# Patient Record
Sex: FEMALE | Race: WHITE | NOT HISPANIC OR LATINO
[De-identification: names, ages, dates, MRNs, and addresses within clinical notes are randomized per-mention and may not be internally consistent; named-entity substitution may affect disease eponyms.]

---

## 2017-01-05 ENCOUNTER — CHARTING TRANS (OUTPATIENT)
Dept: OTHER | Age: 82
End: 2017-01-05

## 2017-03-28 ENCOUNTER — CHARTING TRANS (OUTPATIENT)
Dept: OTHER | Age: 82
End: 2017-03-28

## 2017-03-29 ENCOUNTER — LAB SERVICES (OUTPATIENT)
Dept: OTHER | Age: 82
End: 2017-03-29

## 2017-03-29 LAB
ALBUMIN SERPL BCG-MCNC: 4.5 G/DL (ref 3.6–5.1)
ALP SERPL-CCNC: 60 U/L (ref 45–105)
ALT SERPL W/O P-5'-P-CCNC: 9 U/L (ref 7–34)
AST SERPL-CCNC: 14 U/L (ref 9–37)
BILIRUB DIRECT SERPL-MCNC: 0.1 MG/DL (ref 0–0.2)
BILIRUB SERPL-MCNC: 0.4 MG/DL (ref 0–1)
BUN SERPL-MCNC: 31 MG/DL (ref 7–20)
CALCIUM SERPL-MCNC: 9.6 MG/DL (ref 8.6–10.6)
CHLORIDE SERPL-SCNC: 104 MMOL/L (ref 96–107)
CHOLEST SERPL-MCNC: 153 MG/DL (ref 140–200)
CREATININE, SERUM: 2.2 MG/DL (ref 0.5–1.4)
DIFFERENTIAL TYPE: ABNORMAL
GFR SERPL CREATININE-BSD FRML MDRD: 21 ML/MIN/{1.73M2}
GFR SERPL CREATININE-BSD FRML MDRD: 26 ML/MIN/{1.73M2}
GLUCOSE P FAST SERPL-MCNC: 103 MG/DL (ref 60–100)
HCO3 SERPL-SCNC: 28 MMOL/L (ref 22–32)
HDLC SERPL-MCNC: 88 MG/DL
HEMATOCRIT: 44.2 % (ref 34–45)
HEMOGLOBIN: 14.6 G/DL (ref 11.2–15.7)
LDLC SERPL CALC-MCNC: 48 MG/DL (ref 0–100)
LYMPH PERCENT: 28.9 % (ref 20.5–51.1)
LYMPHOCYTE ABSOLUTE #: 1.6 10*3/UL (ref 1.2–3.4)
MEAN CORPUSCULAR HGB CONCENTRATION: 33 % (ref 32–36)
MEAN CORPUSCULAR HGB: 31.7 PG (ref 27–34)
MEAN CORPUSCULAR VOLUME: 95.9 FL (ref 79–95)
MEAN PLATELET VOLUME: 11.9 FL (ref 8.6–12.4)
MIXED %: 8.1 % (ref 4.3–12.9)
MIXED ABSOLUTE #: 0.5 10*3/UL (ref 0.2–0.9)
NEUTROPHIL ABSOLUTE #: 3.5 10*3/UL (ref 1.4–6.5)
NEUTROPHIL PERCENT: 63 % (ref 34–73.5)
PLATELET COUNT: 157 10*3/UL (ref 150–400)
POTASSIUM SERPL-SCNC: 4.9 MMOL/L (ref 3.5–5.3)
PROT SERPL-MCNC: 6.7 G/DL (ref 6.2–8.1)
RED BLOOD CELL COUNT: 4.61 10*6/UL (ref 3.7–5.2)
RED CELL DISTRIBUTION WIDTH: 14 % (ref 11.3–14.8)
SODIUM SERPL-SCNC: 142 MMOL/L (ref 136–146)
TRIGL SERPL-MCNC: 83 MG/DL (ref 0–200)
TSH SERPL-ACNC: 3.04 M[IU]/L (ref 0.3–4.82)
WHITE BLOOD CELL COUNT: 5.6 10*3/UL (ref 4–10)

## 2017-04-28 ENCOUNTER — CHARTING TRANS (OUTPATIENT)
Dept: OTHER | Age: 82
End: 2017-04-28

## 2017-05-08 ENCOUNTER — CHARTING TRANS (OUTPATIENT)
Dept: INTERNAL MEDICINE | Age: 82
End: 2017-05-08

## 2017-06-15 ENCOUNTER — LAB SERVICES (OUTPATIENT)
Dept: OTHER | Age: 82
End: 2017-06-15

## 2017-06-15 ENCOUNTER — CHARTING TRANS (OUTPATIENT)
Dept: OTHER | Age: 82
End: 2017-06-15

## 2017-06-15 LAB
BILIRUBIN URINE: NEGATIVE
BLOOD URINE: ABNORMAL
CLARITY: ABNORMAL
COLOR: YELLOW
GLUCOSE QUALITATIVE U: NEGATIVE
KETONES, URINE: NEGATIVE
LEUKOCYTE ESTERASE URINE: ABNORMAL
NITRITE URINE: NEGATIVE
PH URINE: 5.5 (ref 5–7)
SPECIFIC GRAVITY URINE: 1.01 (ref 1–1.03)
URINE PROTEIN, QUAL (DIPSTICK): 30
UROBILINOGEN URINE: 0.2

## 2017-06-16 LAB — FINAL REPORT: NORMAL

## 2017-09-25 ENCOUNTER — CHARTING TRANS (OUTPATIENT)
Dept: OTHER | Age: 82
End: 2017-09-25

## 2017-09-30 ENCOUNTER — HOSPITAL ENCOUNTER (EMERGENCY)
Facility: HOSPITAL | Age: 82
Discharge: HOME | End: 2017-09-30
Attending: EMERGENCY MEDICINE
Payer: MEDICARE

## 2017-09-30 VITALS
TEMPERATURE: 98 F | DIASTOLIC BLOOD PRESSURE: 72 MMHG | RESPIRATION RATE: 16 BRPM | HEIGHT: 65 IN | SYSTOLIC BLOOD PRESSURE: 150 MMHG | WEIGHT: 140 LBS | OXYGEN SATURATION: 97 % | BODY MASS INDEX: 23.32 KG/M2 | HEART RATE: 62 BPM

## 2017-09-30 DIAGNOSIS — N30.00 ACUTE CYSTITIS WITHOUT HEMATURIA: ICD-10-CM

## 2017-09-30 DIAGNOSIS — R19.7 DIARRHEA, UNSPECIFIED TYPE: Primary | ICD-10-CM

## 2017-09-30 PROCEDURE — 96361 HYDRATE IV INFUSION ADD-ON: CPT

## 2017-09-30 PROCEDURE — 80053 COMPREHEN METABOLIC PANEL: CPT | Performed by: EMERGENCY MEDICINE

## 2017-09-30 PROCEDURE — 85025 COMPLETE CBC W/AUTO DIFF WBC: CPT | Performed by: EMERGENCY MEDICINE

## 2017-09-30 PROCEDURE — 99284 EMERGENCY DEPT VISIT MOD MDM: CPT

## 2017-09-30 PROCEDURE — 93010 ELECTROCARDIOGRAM REPORT: CPT

## 2017-09-30 PROCEDURE — 83690 ASSAY OF LIPASE: CPT | Performed by: EMERGENCY MEDICINE

## 2017-09-30 PROCEDURE — 87086 URINE CULTURE/COLONY COUNT: CPT | Performed by: EMERGENCY MEDICINE

## 2017-09-30 PROCEDURE — 96365 THER/PROPH/DIAG IV INF INIT: CPT

## 2017-09-30 PROCEDURE — 93005 ELECTROCARDIOGRAM TRACING: CPT

## 2017-09-30 PROCEDURE — 81001 URINALYSIS AUTO W/SCOPE: CPT | Performed by: EMERGENCY MEDICINE

## 2017-09-30 RX ORDER — SULFAMETHOXAZOLE AND TRIMETHOPRIM 800; 160 MG/1; MG/1
1 TABLET ORAL DAILY
Qty: 7 TABLET | Refills: 0 | Status: SHIPPED | OUTPATIENT
Start: 2017-09-30 | End: 2017-10-07

## 2017-09-30 NOTE — ED PROVIDER NOTES
Patient Seen in: [facility] Emergency Department    History   Patient presents with:  Nausea/Vomiting/Diarrhea (gastrointestinal)    Stated Complaint: diarrhea, weakness    HPI    Patient presents with diarrhea.   The patient states that she started distress. HEENT: Normocephalic, atraumatic, pupils equal round and reactive to light, oropharynx clear, uvula midline. Neck: Supple. Cardiovascular: Regular rate and rhythm, no murmurs. Respiratory: Lungs clear to auscultation.   Abdomen: Soft, nontende Report.   Rate: 62  Rhythm: Atrial paced rhythm with prolonged AV conduction  Reading: Incomplete left bundle branch block, subtle nonspecific ST and T-wave abnormality-improved from previous           =======================================================

## 2017-10-02 ENCOUNTER — MYAURORA ACCOUNT LINK (OUTPATIENT)
Dept: OTHER | Age: 82
End: 2017-10-02

## 2017-10-02 ENCOUNTER — CHARTING TRANS (OUTPATIENT)
Dept: OTHER | Age: 82
End: 2017-10-02

## 2017-10-06 ENCOUNTER — CHARTING TRANS (OUTPATIENT)
Dept: OTHER | Age: 82
End: 2017-10-06

## 2017-10-10 ENCOUNTER — CHARTING TRANS (OUTPATIENT)
Dept: INTERNAL MEDICINE | Age: 82
End: 2017-10-10

## 2017-10-12 ENCOUNTER — LAB SERVICES (OUTPATIENT)
Dept: OTHER | Age: 82
End: 2017-10-12

## 2017-10-12 ENCOUNTER — CHARTING TRANS (OUTPATIENT)
Dept: OTHER | Age: 82
End: 2017-10-12

## 2017-10-12 LAB
ALBUMIN SERPL BCG-MCNC: 4 G/DL (ref 3.6–5.1)
ALP SERPL-CCNC: 59 U/L (ref 45–105)
ALT SERPL W/O P-5'-P-CCNC: 9 U/L (ref 7–34)
AST SERPL-CCNC: 13 U/L (ref 9–37)
BILIRUB SERPL-MCNC: 0.4 MG/DL (ref 0–1)
BUN SERPL-MCNC: 35 MG/DL (ref 7–20)
CALCIUM SERPL-MCNC: 9.2 MG/DL (ref 8.6–10.6)
CHLORIDE SERPL-SCNC: 109 MMOL/L (ref 96–107)
CHOLEST SERPL-MCNC: 136 MG/DL (ref 140–200)
CREAT UR-MCNC: 80.2 MG/DL (ref 30–125)
CREATININE, SERUM: 2.2 MG/DL (ref 0.5–1.4)
GFR SERPL CREATININE-BSD FRML MDRD: 21 ML/MIN/{1.73M2}
GFR SERPL CREATININE-BSD FRML MDRD: 25 ML/MIN/{1.73M2}
GLUCOSE P FAST SERPL-MCNC: 111 MG/DL (ref 60–100)
HCO3 SERPL-SCNC: 21 MMOL/L (ref 22–32)
HDLC SERPL-MCNC: 69 MG/DL
LDLC SERPL CALC-MCNC: 49 MG/DL (ref 0–100)
POTASSIUM SERPL-SCNC: 5.2 MMOL/L (ref 3.5–5.3)
PROT SERPL-MCNC: 5.9 G/DL (ref 6.2–8.1)
PROT UR-MCNC: 13 MG/DL (ref 0–12)
PROT/CREAT 24H UR: 0.16 MG/MG (ref 0–0.2)
SODIUM SERPL-SCNC: 140 MMOL/L (ref 136–146)
TRIGL SERPL-MCNC: 87 MG/DL (ref 0–200)
TSH SERPL-ACNC: 2.37 M[IU]/L (ref 0.3–4.82)

## 2017-10-25 ENCOUNTER — APPOINTMENT (OUTPATIENT)
Dept: CV DIAGNOSTICS | Facility: HOSPITAL | Age: 82
DRG: 309 | End: 2017-10-25
Attending: NURSE PRACTITIONER
Payer: MEDICARE

## 2017-10-25 ENCOUNTER — PRIOR ORIGINAL RECORDS (OUTPATIENT)
Dept: OTHER | Age: 82
End: 2017-10-25

## 2017-10-25 ENCOUNTER — APPOINTMENT (OUTPATIENT)
Dept: GENERAL RADIOLOGY | Facility: HOSPITAL | Age: 82
DRG: 309 | End: 2017-10-25
Attending: EMERGENCY MEDICINE
Payer: MEDICARE

## 2017-10-25 ENCOUNTER — HOSPITAL ENCOUNTER (INPATIENT)
Facility: HOSPITAL | Age: 82
LOS: 5 days | Discharge: HOME | DRG: 309 | End: 2017-10-30
Attending: EMERGENCY MEDICINE | Admitting: INTERNAL MEDICINE
Payer: MEDICARE

## 2017-10-25 DIAGNOSIS — I47.2 WIDE-COMPLEX TACHYCARDIA (HCC): Primary | ICD-10-CM

## 2017-10-25 DIAGNOSIS — I48.20 ATRIAL FIBRILLATION, CHRONIC (HCC): ICD-10-CM

## 2017-10-25 PROBLEM — I10 UNSPECIFIED ESSENTIAL HYPERTENSION: Chronic | Status: ACTIVE | Noted: 2017-10-25

## 2017-10-25 PROBLEM — N30.00 ACUTE CYSTITIS WITHOUT HEMATURIA: Status: ACTIVE | Noted: 2017-10-25

## 2017-10-25 PROBLEM — I10 ESSENTIAL HYPERTENSION: Chronic | Status: ACTIVE | Noted: 2017-10-25

## 2017-10-25 PROBLEM — E87.1 HYPONATREMIA: Status: ACTIVE | Noted: 2017-10-25

## 2017-10-25 PROBLEM — R73.9 HYPERGLYCEMIA: Status: ACTIVE | Noted: 2017-10-25

## 2017-10-25 PROBLEM — E03.8 OTHER SPECIFIED HYPOTHYROIDISM: Status: ACTIVE | Noted: 2017-10-25

## 2017-10-25 PROCEDURE — 71010 XR CHEST AP PORTABLE  (CPT=71010): CPT | Performed by: EMERGENCY MEDICINE

## 2017-10-25 PROCEDURE — 4B02XSZ MEASUREMENT OF CARDIAC PACEMAKER, EXTERNAL APPROACH: ICD-10-PCS | Performed by: INTERNAL MEDICINE

## 2017-10-25 PROCEDURE — 99223 1ST HOSP IP/OBS HIGH 75: CPT | Performed by: INTERNAL MEDICINE

## 2017-10-25 PROCEDURE — 93306 TTE W/DOPPLER COMPLETE: CPT | Performed by: NURSE PRACTITIONER

## 2017-10-25 RX ORDER — AMIODARONE HYDROCHLORIDE 50 MG/ML
INJECTION, SOLUTION INTRAVENOUS
Status: COMPLETED
Start: 2017-10-25 | End: 2017-10-25

## 2017-10-25 RX ORDER — METOPROLOL SUCCINATE 50 MG/1
50 TABLET, EXTENDED RELEASE ORAL
Status: DISCONTINUED | OUTPATIENT
Start: 2017-10-26 | End: 2017-10-30

## 2017-10-25 RX ORDER — PRAVASTATIN SODIUM 20 MG
10 TABLET ORAL NIGHTLY
Status: DISCONTINUED | OUTPATIENT
Start: 2017-10-25 | End: 2017-10-25

## 2017-10-25 RX ORDER — HEPARIN SODIUM AND DEXTROSE 10000; 5 [USP'U]/100ML; G/100ML
INJECTION INTRAVENOUS CONTINUOUS
Status: DISPENSED | OUTPATIENT
Start: 2017-10-25 | End: 2017-10-29

## 2017-10-25 RX ORDER — HEPARIN SODIUM AND DEXTROSE 10000; 5 [USP'U]/100ML; G/100ML
12 INJECTION INTRAVENOUS ONCE
Status: COMPLETED | OUTPATIENT
Start: 2017-10-25 | End: 2017-10-25

## 2017-10-25 RX ORDER — LEVOTHYROXINE SODIUM 0.03 MG/1
25 TABLET ORAL
Status: DISCONTINUED | OUTPATIENT
Start: 2017-10-25 | End: 2017-10-30

## 2017-10-25 RX ORDER — HEPARIN SODIUM 5000 [USP'U]/ML
60 INJECTION INTRAVENOUS; SUBCUTANEOUS ONCE
Status: COMPLETED | OUTPATIENT
Start: 2017-10-25 | End: 2017-10-25

## 2017-10-25 RX ORDER — METOPROLOL TARTRATE 5 MG/5ML
5 INJECTION INTRAVENOUS ONCE
Status: DISCONTINUED | OUTPATIENT
Start: 2017-10-25 | End: 2017-10-25

## 2017-10-25 RX ORDER — AMIODARONE HYDROCHLORIDE 200 MG/1
400 TABLET ORAL 2 TIMES DAILY WITH MEALS
Status: DISCONTINUED | OUTPATIENT
Start: 2017-10-25 | End: 2017-10-25

## 2017-10-25 RX ORDER — LATANOPROST 50 UG/ML
1 SOLUTION/ DROPS OPHTHALMIC NIGHTLY
COMMUNITY

## 2017-10-25 RX ORDER — ASPIRIN 81 MG/1
81 TABLET, CHEWABLE ORAL DAILY
Status: DISCONTINUED | OUTPATIENT
Start: 2017-10-25 | End: 2017-10-30

## 2017-10-25 RX ORDER — METOPROLOL TARTRATE 5 MG/5ML
INJECTION INTRAVENOUS
Status: DISPENSED
Start: 2017-10-25 | End: 2017-10-25

## 2017-10-25 RX ORDER — TIMOLOL MALEATE 5 MG/ML
1 SOLUTION/ DROPS OPHTHALMIC EVERY MORNING
COMMUNITY

## 2017-10-25 RX ORDER — PRAVASTATIN SODIUM 10 MG
10 TABLET ORAL NIGHTLY
Status: DISCONTINUED | OUTPATIENT
Start: 2017-10-25 | End: 2017-10-30

## 2017-10-25 NOTE — PLAN OF CARE
Maintains optimal cardiac output and hemodynamic stability Progressing      Absence of cardiac arrhythmias or at baseline Progressing      Patient admitted from ED . Oriented to room.  Admission database completed  AOx4 forgetful;   [address] blt hearing aids  P

## 2017-10-25 NOTE — ED PROVIDER NOTES
Patient Seen in: [facility] Emergency Department    History   No chief complaint on file. Stated Complaint: SOB    HPI    Patient is a 26-year-old female, presenting for evaluation of dyspnea.   Patient states she has had a decreased appetite and Pupils are equal and reactive to light. Oropharynx is pink and moist.  NECK: Neck is supple and nontender. The trachea is midline. LUNGS: Lungs are clear to auscultation bilaterally, respirations are unlabored. HEART: Tachycardic and regular.  There are -----------         ------                     CBC W/ DIFFERENTIAL[[id number]]          Abnormal            Final result                 Please view results for these tests on the individual orders.    URINALYSIS WITH CULTURE REFLEX   BLOOD CULTURE   BLOOD C infusion was ordered. Repeat EKG was obtained and reviewed as noted above. Results of the patient's laboratory and radiology studies were reviewed and discussed with the patient, who remains complaint free on the cart. Amiodarone infusion continues.

## 2017-10-25 NOTE — HISTORICAL OFFICE NOTE
Celestino Chawla  : 10/13/1927  ACCOUNT:  [id number]  [phone]  PCP: Dr. Gladis Pepper     TODAY'S DATE: 2016  DICTATED BY:  Sergio Hess M.D.]    CHIEF COMPLAINT: [Followup of Pacemaker, dependent and Followup of Unspecified atrial flutter.]    HPI: DAILY      04/23/13 *Metoprolol Succinate 25MG      1 TABLET DAILY.                          01/29/10 Travatan Z            0.004%    as direcetd                              01/29/10 Aspirin               81MG      1 tablet daily

## 2017-10-25 NOTE — CONSULTS
[facility]  Report of Consultation    Prudy Plenty Patient Status:  Inpatient    10/13/1927 MRN [id number]   [facility] 0SW-A Attending Ruth Grover MD   Hosp Day # 0 PCP Liberty Syed MD, MADAI     Reason f INSERTION  No date: EYE SURGERY  No date: INNER EAR SURGERY PROC UNLISTED      Comment: as a child  No date: OTHER      Comment: RFA for flutter mastoid as a child  No family history on file. reports that she has never smoked.  She has never used smokeles with LBBB QRS morphology  EKG: afib @ 104  Labs:     Lab Results  Component Value Date   WBC 9.0 10/25/2017   RBC 4.33 10/25/2017   HGB 13.6 10/25/2017   HCT 42.3 10/25/2017   MCV 97.7 10/25/2017   MCH 31.4 10/25/2017   MCHC 32.2 10/25/2017   RDW 13.9 10/2

## 2017-10-25 NOTE — H&P
[facility]                                                               History & Physical         Justice Kae Patient Status:  Inpatient    10/13/1927 MRN [id number]   [place] [facility] 0SW-A Attending Ramon Cornell MD   [facility] Day # 0 Disp:  Rfl:  10/24/2017 at 0900   latanoprost 0.005 % Ophthalmic Solution Place 1 drop into both eyes nightly. Disp:  Rfl:  10/24/2017 at 2100   aspirin 81 MG Oral Chew Tab Chew 81 mg by mouth daily.  Disp:  Rfl:  10/24/2017 at 0900   Lovastatin 20 MG Oral 10/25/2017   ALB 3.2 10/25/2017   ALKPHO 54 10/25/2017   BILT 0.7 10/25/2017   TP 6.7 10/25/2017   AST 11 10/25/2017   ALT 13 10/25/2017   PTT 29.0 10/25/2017   INR 1.05 10/25/2017   PTP 13.7 10/25/2017   TROP <0.046 10/25/2017       Recent Labs   Lab  10/ Prophylaxis: SCDs, IV heparin drip   · CODE status: full  · Agrawal: None    Plan of care discussed with patient, RN      Discussed with ER Physician.       **Certification      PHYSICIAN Certification of Need for Inpatient Hospitalization - Initial Certifica

## 2017-10-25 NOTE — ED INITIAL ASSESSMENT (HPI)
89YF c/c of SOB Pt state she was feeling well and SOB this morning Pt state she was recently treat two weeks ago for UTI

## 2017-10-26 ENCOUNTER — APPOINTMENT (OUTPATIENT)
Dept: CV DIAGNOSTICS | Facility: HOSPITAL | Age: 82
DRG: 309 | End: 2017-10-26
Attending: INTERNAL MEDICINE
Payer: MEDICARE

## 2017-10-26 PROCEDURE — 93018 CV STRESS TEST I&R ONLY: CPT | Performed by: INTERNAL MEDICINE

## 2017-10-26 PROCEDURE — 78452 HT MUSCLE IMAGE SPECT MULT: CPT | Performed by: INTERNAL MEDICINE

## 2017-10-26 PROCEDURE — 93017 CV STRESS TEST TRACING ONLY: CPT | Performed by: INTERNAL MEDICINE

## 2017-10-26 PROCEDURE — 99232 SBSQ HOSP IP/OBS MODERATE 35: CPT | Performed by: INTERNAL MEDICINE

## 2017-10-26 RX ORDER — MAGNESIUM OXIDE 400 MG (241.3 MG MAGNESIUM) TABLET
400 TABLET ONCE
Status: COMPLETED | OUTPATIENT
Start: 2017-10-26 | End: 2017-10-26

## 2017-10-26 RX ORDER — AMIODARONE HYDROCHLORIDE 200 MG/1
400 TABLET ORAL 2 TIMES DAILY WITH MEALS
Status: DISCONTINUED | OUTPATIENT
Start: 2017-10-26 | End: 2017-10-30

## 2017-10-26 RX ORDER — DILTIAZEM HYDROCHLORIDE 5 MG/ML
10 INJECTION INTRAVENOUS EVERY 2 HOUR PRN
Status: DISCONTINUED | OUTPATIENT
Start: 2017-10-26 | End: 2017-10-30

## 2017-10-26 NOTE — PROGRESS NOTES
Preliminary Cardiac Diagnostic Note:    No additional ekg changes from basline changes with lexiscan injection for nuclear stress test  Pt denied cardiac symptoms  Intermittant pacing and few pvcs noted  nuc pending

## 2017-10-26 NOTE — PROGRESS NOTES
Pt received from stress test with amiodarone paused. RN was informed that Amiodarone was paused in transport because the pump started to beep. RN noticed a large lump on L arm from amiodarone infiltration.  Warm compresses applied per pharmacy recommendatio

## 2017-10-26 NOTE — PROGRESS NOTES
[facility]  Cardiology Progress Note    Reggie Rangel Patient Status:  Inpatient    10/13/1927 MRN [id number]   [facility] 2NE-A Attending Mark Pope MD   Hosp Day # 1 PCP DREYER MD, ROMAN     Subjective:  No complaints of chest p bruits. Cardiac: Regular rate and rhythm, S1, S2 normal,   Lungs: Clear Abdomen: Soft, non-tender. Extremities: Without clubbing, cyanosis or edema. Peripheral pulses are 2+. Skin: Warm and dry.      Medications:  • aspirin  81 mg Oral Daily   • Levoth

## 2017-10-26 NOTE — PROGRESS NOTES
[facility]  Progress Note    Shannan Chen Patient Status:  Inpatient    10/13/1927 MRN [id number]   [facility] 2NE-A Attending Bhupendra Richardson MD   Hosp Day # 1 PCP Anastasia Neville MD, MADAI     CC: Palpitations    SUBJECTIVE:  Palpitations li 13.2 10/26/2017   HCT 40.4 10/26/2017   .0 10/26/2017   CREATSERUM 1.87 10/26/2017   BUN 25 10/26/2017    10/26/2017   K 4.5 10/26/2017    10/26/2017   CO2 23.0 10/26/2017    10/26/2017   CA 8.3 10/26/2017   PTT 56.3 10/26/2017 post 1 dose of IV Lopressor on admission. 4.  Continued on home dose of metoprolol  5. Stress test today per cardiology  6. Potassium and magnesium level normal  2.  Atrial fibrillation with previous history of pacemaker–cardiology following  3.  hypothyr

## 2017-10-26 NOTE — PAYOR COMM NOTE
Ivetpatricia Connell  (MR # [id number])   Order Admit to inpatient Once Nadia Mcghee (Order [id number])   Order Requisition   Admit to inpatient Once (Order #[id number]) on 10/25/17        Question Answer   Diagnosis Wide-complex tachycardia [facility])       ADMISSION REVIEW PEPTIDE - Abnormal; Notable for the following:     Pro-Beta Natriuretic Peptide 5,944 (*)    CBC W/ DIFFERENTIAL - Abnormal; Notable for the following:     RDW-SD 50.6 (*)     Neutrophil Absolute Prelim 6.95 (*)     Neutrophil Absolute 6.95 (*)     Lymphoc numbness. Home Medications:    Prescriptions Prior to Admission:  Timolol Maleate 0.5 % Ophthalmic Solution Place 1 drop into both eyes every morning. latanoprost 0.005 % Ophthalmic Solution Place 1 drop into both eyes nightly.    aspirin 81 MG Oral Ch sensitivity  5. Hyperlipidemia–continue statin  6. Essential hypertension–continue metoprolol, follow blood pressure  7. Glaucoma–continue home eyedrops  Mild hyponatremia    Ana Narvaez MD  10/25/2017    CARDS CONSULT:   Wide complex tachycardia - ? VT v Units/hr Intravenous Gianaisaias Cisneros RN      heparin (PORCINE) 26159ocfsr/250mL infusion INITIAL DOSE     Date Action Dose Route User    10/25/2017 1410 New Bag 12 Units/kg/hr × 63.5 kg Intravenous Lio Pulido RN      Levothyroxine Sodium ([place] Lower frequent couplets, triplets, short runs of NSVT  Up to 15 beast last night    Medications:  • aspirin  81 mg Oral Daily   • Levothyroxine Sodium  25 mcg Oral Before breakfast   • metoprolol succinate  50 mg Oral Daily Beta Blocker   • Pravastatin Sodium  1

## 2017-10-26 NOTE — PLAN OF CARE
Stress test negative for ischemia, EF 43%. Discussed with Dr. Ron Ocampo, transition to PO amiodarone 400 mg BID.

## 2017-10-26 NOTE — PROGRESS NOTES
Received pt from [facility] at [address]. Pt a/ox4. Forgetful. [place] with bilateral HA. RA. V-paced with underlying a-fib. Bursts of wide complex tachycardia at times. EF = 60-65%. BM today. IV anbx given in ER for UTI. Urine culture pending.  Chronic rash to back and bu

## 2017-10-27 PROCEDURE — 99232 SBSQ HOSP IP/OBS MODERATE 35: CPT | Performed by: INTERNAL MEDICINE

## 2017-10-27 PROCEDURE — 5A2204Z RESTORATION OF CARDIAC RHYTHM, SINGLE: ICD-10-PCS | Performed by: INTERNAL MEDICINE

## 2017-10-27 RX ORDER — SODIUM CHLORIDE 9 MG/ML
INJECTION, SOLUTION INTRAVENOUS CONTINUOUS
Status: DISCONTINUED | OUTPATIENT
Start: 2017-10-27 | End: 2017-10-29

## 2017-10-27 NOTE — PROGRESS NOTES
FOR BEDSDE CARDIOVERSION. CONSENT SIGNED. ON BEDSIDE. ROUTINE PRE-OP CARE RENDERED. FAMILY MEMBER ON BEDSIDE. O2 2L/NC. SUCTION SET-UP INITIATED. Kristian Knock 9 NS AT 50 CC/HR. GIVEN BREVITAL 40 MG IVP PER DR. ABBOTT. CARDIOVERTED WITH 200 JOULES. BACK TO ATRIAL PACED. BP-10

## 2017-10-27 NOTE — PROCEDURES
PROCEDURE(S) PERFORMED:    1. Cardioversion. 2.     Sedation     :  Anette Hernandez MD     ANESTHESIA:  IV sedation. INDICATION:  Persistent atrial fibrillation. COMPLICATIONS:  None.      METHODS:  The patient was brought to the outpatient

## 2017-10-27 NOTE — PROGRESS NOTES
[facility]  Progress Note    Alyssa Gurrola Patient Status:  Inpatient    10/13/1927 MRN [id number]   [facility] 2NE-A Attending Ana Rabago MD   Hosp Day # 2 PCP Hari Vásquez MD, MADAI     CC: Palpitations    SUBJECTIVE:  Palpitations li CHEST AP PORTABLE  (CPT=71010), 1/25/2014, 12:17.      INDICATIONS:  SOB     PATIENT STATED HISTORY: (As transcribed by Technologist)  Patient states that she was short of breath this morning and that she had a recent UTI.         =====  CONCLUSION:      No sensitivity with no growth  5. Hyperlipidemia–continue statin  6. Essential hypertension–continue metoprolol, follow blood pressure  7. Glaucoma–continue home eyedrops  8.  Mild hyponatremia        Quality:  · DVT Prophylaxis: SCDs, IV heparin drip   · CODE

## 2017-10-27 NOTE — PROGRESS NOTES
[facility]  Cardiology Progress Note    Anca Rios Patient Status:  Inpatient    10/13/1927 MRN [id number]   [facility] 2NE-A Attending Kavita Ventura MD   Hosp Day # 2 PCP DREYER MD, MADAI     Subjective:  Feels great, \"so much b flutter with prior ablation 2010  · SSS s/p St. Jaden PPM 2010  · CKD- stable     Plan:     · Continue heparin. Will need to discuss long term anticoagulation. · Possible DCCV, to review with Dr. Porsche Clark  · Continue Amiodarone, BB.  PRN Cardizem for tachycar

## 2017-10-27 NOTE — CM/SW NOTE
10/27/17 1430   CM/SW Screening   Referral [phone] [facility] staff; Chart review;Nursing rounds   Patient's Mental Status Alert;Oriented   Patient's Home Environment Senior Independent Housing  [facility])   Patient

## 2017-10-27 NOTE — PLAN OF CARE
A/Ox4, vss on room air. afib on tele, up to 120s with activity. BB given this evening rather than am due to stress test, will monitor for need for PRN Cardizem. Heparin drip infusing per acs/afib protocol.  DAVIS swelling from infiltrated IV this afternoon, w

## 2017-10-28 ENCOUNTER — PRIOR ORIGINAL RECORDS (OUTPATIENT)
Dept: OTHER | Age: 82
End: 2017-10-28

## 2017-10-28 PROBLEM — I49.5 SICK SINUS SYNDROME (HCC): Chronic | Status: ACTIVE | Noted: 2017-10-28

## 2017-10-28 PROBLEM — Z86.79 ATRIAL FIBRILLATION, CURRENTLY IN SINUS RHYTHM: Status: ACTIVE | Noted: 2017-10-25

## 2017-10-28 PROBLEM — Z95.0 HISTORY OF CARDIAC PACEMAKER: Chronic | Status: ACTIVE | Noted: 2017-10-28

## 2017-10-28 PROCEDURE — 99232 SBSQ HOSP IP/OBS MODERATE 35: CPT | Performed by: INTERNAL MEDICINE

## 2017-10-28 RX ORDER — DIPHENHYDRAMINE HCL 25 MG
25 CAPSULE ORAL EVERY 6 HOURS PRN
Status: DISCONTINUED | OUTPATIENT
Start: 2017-10-28 | End: 2017-10-30

## 2017-10-28 NOTE — PLAN OF CARE
Alert. Oriented. [place]- vishal h.aids. Denies cp, sob. A.paced/sr per tele. hepain gtt infusing. Up w/ sb assist. Voiding adeq. poc updated and discussed with patient. Cont. to [facility] pt.

## 2017-10-28 NOTE — PROGRESS NOTES
[facility] Cardiology  Progress Note    Marcy Larose Patient Status:  Inpatient    10/13/1927 MRN [id number]   [facility] 2NE-A Attending Crissy Mcginnis MD   Hosp Day # 3 PCP DREYER MD, ROMAN     Subjective:  Resting comfortably.   No issues Check QT on EKG tomorrow. Anticipate home Monday if no further rhythm issues. D/W nursing.     Blanca Garcia  10/28/2017  12:00 PM

## 2017-10-28 NOTE — PLAN OF CARE
Dr Fariha Medina in with pt for successful cardioversion at [address] [place] [address]. Pt VSS post stable (see recovery VS per flowsheets.) . Pt HR is Apaced. Pt states she is feeling better.

## 2017-10-28 NOTE — CM/SW NOTE
Order received to check Eliquis coverage. Per APN the pt will not dc until Monday. CM requested a SW order with full prescribing information if it is decided she will need Eliquis at dc.      Penelope Saenz RN, [facility]    [phone] pgr: [phone]

## 2017-10-28 NOTE — PLAN OF CARE
Received patient at [phone] from cath lab. Alert and Oriented x4. Tele Rhythm NSR with BBB. O2 saturation 96% On room air. Breath sounds diminished. Bed is locked and in low position. Call light and personal items within reach. No C/O chest pain or SOB.  Pt or

## 2017-10-28 NOTE — PROGRESS NOTES
[facility]  Progress Note    Brian Harm Patient Status:  Inpatient    10/13/1927 MRN [id number]   [facility] 2NE-A Attending Valerie Garcia MD   Hosp Day # 3 PCP Rupa Armstrong MD, MADAI     CC: Palpitations    SUBJECTIVE:  Palpitations li Imaging:  XR CHEST AP PORTABLE (CPT=71010)     TECHNIQUE:  AP chest radiograph was obtained. COMPARISON:  [facility] XR CHEST AP PORTABLE  (CPT=71010), 1/25/2014, 12:17.      INDICATIONS:  SOB     PATIENT STATED HISTORY: (As transcribed by [place] cardioversion by Dr. Jovon Sue 10/27/2017  9. Monitor over the weekend for further episodes of wide-complex tachycardia episodes and discharge on Monday per cardiology Dr. Jovon Sue  2.  Atrial fibrillation with RVR ; previous history of pacemaker–cardiology following;

## 2017-10-28 NOTE — PHYSICAL THERAPY NOTE
PHYSICAL THERAPY EVALUATION - INPATIENT     Room Number: 9951/6281-G  Evaluation Date: 10/28/2017  Type of Evaluation: Initial  Physician Order: PT Eval and Treat    Presenting Problem: s/p ED visit on 10/25/17 with dysnpea and tachycardia  Reason for needs one. SUBJECTIVE  \"I didn't recognize where I was\"    Patient self-stated goal is to go home.     OBJECTIVE  Precautions: Hard of hearing  Fall Risk: Standard fall risk    WEIGHT BEARING RESTRICTION  Weight Bearing Restriction: None Score:  Raw Score: 22   PT Approx Degree of Impairment Score: 20.91%   Standardized Score (AM-PAC Scale): 53.28   CMS Modifier (G-Code): CJ    FUNCTIONAL ABILITY STATUS  Gait Assessment   Gait Assistance: Supervision  Distance (ft): 200  Assistive Device: this evaluation, patient's clinical presentation is stable and overall the evaluation complexity is considered moderate. These impairments and comorbidities manifest themselves as functional limitations in independent bed mobility, transfers, and gait.   Apurva Han

## 2017-10-29 PROCEDURE — 99232 SBSQ HOSP IP/OBS MODERATE 35: CPT | Performed by: INTERNAL MEDICINE

## 2017-10-29 NOTE — PROGRESS NOTES
· [facility] Card[facility] Progress Note     Subjective:  Up in chair - no pain or dyspnea    Objective:  A pace, v sense  147/48  Afebrile  I/O - 270    PTT 63.1    Cardiac:S1 S2 regula  Lungs:clear   Abdomen:soft  Extremities:no edema  Skin:warm/dry    As

## 2017-10-29 NOTE — PROGRESS NOTES
[facility]  Progress Note    Dee Gave Patient Status:  Inpatient    10/13/1927 MRN [id number]   [facility] 2NE-A Attending Mehran Newsome MD   Hosp Day # 4 PCP Oral Rhoda HERNANDEZ, MADAI     CC: Palpitations    SUBJECTIVE:  Palpitations li radiograph was obtained. COMPARISON:  [facility] , XR CHEST AP PORTABLE  (CPT=71010), 1/25/2014, 12:17.      INDICATIONS:  SOB     PATIENT STATED HISTORY: (As transcribed by Technologist)  Patient states that she was short of breath this morning and that sh previous history of pacemaker–cardiology following; on metoprolol and heparin drip; IV cardizem Prn; possible cardioversion per cardiology  3.  hypothyroidism–continue levothyroxine, follow TSH with reflex free T4  4.  Dysuria with positive UA , no UTI– dis

## 2017-10-30 ENCOUNTER — PRIOR ORIGINAL RECORDS (OUTPATIENT)
Dept: OTHER | Age: 82
End: 2017-10-30

## 2017-10-30 VITALS
BODY MASS INDEX: 24 KG/M2 | TEMPERATURE: 97 F | WEIGHT: 143.31 LBS | OXYGEN SATURATION: 97 % | RESPIRATION RATE: 18 BRPM | DIASTOLIC BLOOD PRESSURE: 52 MMHG | SYSTOLIC BLOOD PRESSURE: 134 MMHG | HEART RATE: 62 BPM

## 2017-10-30 PROCEDURE — 99239 HOSP IP/OBS DSCHRG MGMT >30: CPT | Performed by: INTERNAL MEDICINE

## 2017-10-30 RX ORDER — AMIODARONE HYDROCHLORIDE 200 MG/1
200 TABLET ORAL DAILY
Qty: 30 TABLET | Refills: 1 | Status: SHIPPED | OUTPATIENT
Start: 2017-10-30 | End: 2018-01-12

## 2017-10-30 NOTE — CM/SW NOTE
Order received to check for Eliquis coverage. Prescription faxed to patient's [address] [phone].     Gretta Penn RN,   Phone [phone]  Pager [phone] prior auth required, call to Reggie Leahy, appr

## 2017-10-30 NOTE — PROGRESS NOTES
[facility]  Progress Note    Cloretta Eder Patient Status:  Inpatient    10/13/1927 MRN [id number]   [facility] 2NE-A Attending Chely Schuster MD   Hosp Day # 11 PCP Michel Plaza MD, MADAI     CC: Palpitations    SUBJECTIVE:  Palpitations li obtained. COMPARISON:  [facility] , XR CHEST AP PORTABLE  (CPT=71010), 1/25/2014, 12:17.      INDICATIONS:  SOB     PATIENT STATED HISTORY: (As transcribed by Technologist)  Patient states that she was short of breath this morning and that she had a recent pacemaker–cardiology following; on metoprolol and heparin drip; IV cardizem Prn; possible cardioversion per cardiology  3.  hypothyroidism–continue levothyroxine, follow TSH with reflex free T4  4.  Dysuria with positive UA , no UTI– discontinued IV Rocephi

## 2017-10-30 NOTE — PROGRESS NOTES
[facility]  Cardiology Progress Note    Efrain Liu Patient Status:  Inpatient    10/13/1927 MRN [id number]   [facility] 2NE-A Attending Lio Portillo MD   Hosp Day # 5 PCP Destiny Brooks MD, MADAI     Subjective:  Doing well with no compl ms, in sinus   · Hx Atrial flutter with prior ablation 2010  · SSS s/p St. Jaden PPM 2010  · CKD- stable     Plan:     · Social work to look into coverage for [employer]. Will likely be able to stop ASA. · Decrease Amiodarone to 200 mg daily at discharge.   ·

## 2017-10-30 NOTE — DISCHARGE SUMMARY
[facility]  Discharge Summary    Damien Gaines Patient Status:  Inpatient    10/13/1927 MRN [id number]   [facility] 2NE-A Attending No att. providers found   Hosp Day # 5 PCP MADAI Valdivia MD     Date of Admission: 10/25/2017    Date cardiology  Was initially treated with IV amiodarone drip which was changed to oral by cardiology.    Status post 1 dose of IV Lopressor on admission.    Continued on home dose of metoprolol  Patient had a stress test done which was normal  Potassium and m Discharge Medications      START taking these medications      Instructions Prescription details   amiodarone HCl 200 MG Tabs  Commonly known as:  PACERONE      Take 1 tablet (200 mg total) by mouth daily.    Quantity:  30 tablet  Refills:  1     api (Oral)   Resp 18   Wt 143 lb 4.8 oz (65 kg)   SpO2 97%   BMI 23.85 kg/m²       General appearance: alert and cooperative  Head: Normocephalic, without obvious abnormality, atraumatic  Throat: oral mucosa moist  Neck: no adenopathy, no carotid bruit, no JVD

## 2017-10-31 ENCOUNTER — PRIOR ORIGINAL RECORDS (OUTPATIENT)
Dept: OTHER | Age: 82
End: 2017-10-31

## 2017-11-10 ENCOUNTER — LAB SERVICES (OUTPATIENT)
Dept: OTHER | Age: 82
End: 2017-11-10

## 2017-11-10 ENCOUNTER — CHARTING TRANS (OUTPATIENT)
Dept: OTHER | Age: 82
End: 2017-11-10

## 2017-11-10 LAB
BILIRUBIN URINE: NEGATIVE
BLOOD URINE: ABNORMAL
CLARITY: ABNORMAL
COLOR: YELLOW
GLUCOSE QUALITATIVE U: NEGATIVE
KETONES, URINE: ABNORMAL
LEUKOCYTE ESTERASE URINE: ABNORMAL
NITRITE URINE: POSITIVE
PH URINE: 5.5 (ref 5–7)
SPECIFIC GRAVITY URINE: 1.02 (ref 1–1.03)
URINE PROTEIN, QUAL (DIPSTICK): 30
UROBILINOGEN URINE: 0.2

## 2017-11-13 LAB — FINAL REPORT: ABNORMAL

## 2017-11-15 ENCOUNTER — PRIOR ORIGINAL RECORDS (OUTPATIENT)
Dept: OTHER | Age: 82
End: 2017-11-15

## 2017-11-15 ENCOUNTER — MYAURORA ACCOUNT LINK (OUTPATIENT)
Dept: OTHER | Age: 82
End: 2017-11-15

## 2017-12-04 LAB
BUN: 33 MG/DL
CALCIUM: 9.2 MG/DL
CHLORIDE: 106 MEQ/L
CREATININE, SERUM: 1.9 MG/DL
GLUCOSE: 89 MG/DL
HEMATOCRIT: 38.4 %
HEMOGLOBIN: 12.4 G/DL
MAGNESIUM: 2.2 MG/DL
PLATELETS: 166 K/UL
POTASSIUM, SERUM: 4.6 MEQ/L
PROBNP: 5944 PG/ML
RED BLOOD COUNT: 3.96 X 10-6/U
SODIUM: 139 MEQ/L
WHITE BLOOD COUNT: 5.9 X 10-3/U

## 2018-01-12 ENCOUNTER — HOSPITAL ENCOUNTER (OUTPATIENT)
Facility: HOSPITAL | Age: 83
Setting detail: OBSERVATION
Discharge: NURSING, REHAB OR LONG-TERM CARE | End: 2018-01-15
Attending: EMERGENCY MEDICINE | Admitting: HOSPITALIST
Payer: MEDICARE

## 2018-01-12 ENCOUNTER — APPOINTMENT (OUTPATIENT)
Dept: CT IMAGING | Facility: HOSPITAL | Age: 83
End: 2018-01-12
Attending: EMERGENCY MEDICINE
Payer: MEDICARE

## 2018-01-12 DIAGNOSIS — R47.81 SLURRED SPEECH: Primary | ICD-10-CM

## 2018-01-12 LAB
ALBUMIN SERPL-MCNC: 3 G/DL (ref 3.5–4.8)
ALP LIVER SERPL-CCNC: 67 U/L (ref 55–142)
ALT SERPL-CCNC: 13 U/L (ref 14–54)
APTT PPP: 30.9 SECONDS (ref 25–34)
AST SERPL-CCNC: 14 U/L (ref 15–41)
ATRIAL RATE: 60 BPM
BASOPHILS # BLD AUTO: 0.04 X10(3) UL (ref 0–0.1)
BASOPHILS NFR BLD AUTO: 0.6 %
BILIRUB SERPL-MCNC: 0.3 MG/DL (ref 0.1–2)
BILIRUB UR QL STRIP.AUTO: NEGATIVE
BUN BLD-MCNC: 42 MG/DL (ref 8–20)
CALCIUM BLD-MCNC: 8.9 MG/DL (ref 8.3–10.3)
CHLORIDE: 106 MMOL/L (ref 101–111)
CO2: 19 MMOL/L (ref 22–32)
COLOR UR AUTO: YELLOW
CREAT BLD-MCNC: 2.91 MG/DL (ref 0.55–1.02)
EOSINOPHIL # BLD AUTO: 0.25 X10(3) UL (ref 0–0.3)
EOSINOPHIL NFR BLD AUTO: 3.7 %
ERYTHROCYTE [DISTWIDTH] IN BLOOD BY AUTOMATED COUNT: 13.5 % (ref 11.5–16)
GLUCOSE BLD-MCNC: 101 MG/DL (ref 70–99)
GLUCOSE UR STRIP.AUTO-MCNC: NEGATIVE MG/DL
HCT VFR BLD AUTO: 39 % (ref 34–50)
HGB BLD-MCNC: 13 G/DL (ref 12–16)
IMMATURE GRANULOCYTE COUNT: 0.02 X10(3) UL (ref 0–1)
IMMATURE GRANULOCYTE RATIO %: 0.3 %
INR BLD: 1.11 (ref 0.89–1.11)
KETONES UR STRIP.AUTO-MCNC: NEGATIVE MG/DL
LYMPHOCYTES # BLD AUTO: 0.51 X10(3) UL (ref 0.9–4)
LYMPHOCYTES NFR BLD AUTO: 7.5 %
M PROTEIN MFR SERPL ELPH: 6.8 G/DL (ref 6.1–8.3)
MCH RBC QN AUTO: 31.6 PG (ref 27–33.2)
MCHC RBC AUTO-ENTMCNC: 33.3 G/DL (ref 31–37)
MCV RBC AUTO: 94.7 FL (ref 81–100)
MONOCYTES # BLD AUTO: 0.72 X10(3) UL (ref 0.1–0.6)
MONOCYTES NFR BLD AUTO: 10.6 %
NEUTROPHIL ABS PRELIM: 5.27 X10 (3) UL (ref 1.3–6.7)
NEUTROPHILS # BLD AUTO: 5.27 X10(3) UL (ref 1.3–6.7)
NEUTROPHILS NFR BLD AUTO: 77.3 %
NITRITE UR QL STRIP.AUTO: NEGATIVE
P AXIS: 78 DEGREES
P-R INTERVAL: 276 MS
PH UR STRIP.AUTO: 6 [PH] (ref 4.5–8)
PLATELET # BLD AUTO: 233 10(3)UL (ref 150–450)
POTASSIUM SERPL-SCNC: 5 MMOL/L (ref 3.6–5.1)
PROT UR STRIP.AUTO-MCNC: 30 MG/DL
PSA SERPL DL<=0.01 NG/ML-MCNC: 14.3 SECONDS (ref 12–14.3)
Q-T INTERVAL: 462 MS
QRS DURATION: 126 MS
QTC CALCULATION (BEZET): 462 MS
R AXIS: 15 DEGREES
RBC # BLD AUTO: 4.12 X10(6)UL (ref 3.8–5.1)
RBC #/AREA URNS AUTO: >10 /HPF
RED CELL DISTRIBUTION WIDTH-SD: 47.6 FL (ref 35.1–46.3)
SODIUM SERPL-SCNC: 134 MMOL/L (ref 136–144)
SP GR UR STRIP.AUTO: 1.01 (ref 1–1.03)
T AXIS: 49 DEGREES
UROBILINOGEN UR STRIP.AUTO-MCNC: <2 MG/DL
VENTRICULAR RATE: 60 BPM
WBC # BLD AUTO: 6.8 X10(3) UL (ref 4–13)
WBC #/AREA URNS AUTO: >50 /HPF
WBC CLUMPS UR QL AUTO: PRESENT

## 2018-01-12 PROCEDURE — 99220 INITIAL OBSERVATION CARE,LEVL III: CPT | Performed by: HOSPITALIST

## 2018-01-12 PROCEDURE — 70450 CT HEAD/BRAIN W/O DYE: CPT | Performed by: EMERGENCY MEDICINE

## 2018-01-12 RX ORDER — ACETAMINOPHEN 325 MG/1
650 TABLET ORAL EVERY 4 HOURS PRN
Status: DISCONTINUED | OUTPATIENT
Start: 2018-01-12 | End: 2018-01-15

## 2018-01-12 RX ORDER — LABETALOL HYDROCHLORIDE 5 MG/ML
10 INJECTION, SOLUTION INTRAVENOUS EVERY 10 MIN PRN
Status: DISCONTINUED | OUTPATIENT
Start: 2018-01-12 | End: 2018-01-15

## 2018-01-12 RX ORDER — PHENYLEPHRINE HCL IN 0.9% NACL 50MG/250ML
PLASTIC BAG, INJECTION (ML) INTRAVENOUS CONTINUOUS PRN
Status: DISCONTINUED | OUTPATIENT
Start: 2018-01-12 | End: 2018-01-13

## 2018-01-12 RX ORDER — FAMOTIDINE 10 MG/ML
20 INJECTION, SOLUTION INTRAVENOUS DAILY
Status: DISCONTINUED | OUTPATIENT
Start: 2018-01-12 | End: 2018-01-12

## 2018-01-12 RX ORDER — CALCIUM CARBONATE/VITAMIN D3 600 MG-10
1 TABLET ORAL DAILY
COMMUNITY
End: 2020-09-22

## 2018-01-12 RX ORDER — PANTOPRAZOLE SODIUM 20 MG/1
20 TABLET, DELAYED RELEASE ORAL
COMMUNITY
End: 2020-09-22

## 2018-01-12 RX ORDER — OMEGA-3/DHA/EPA/FISH OIL 60 MG-90MG
CAPSULE ORAL DAILY
Status: DISCONTINUED | OUTPATIENT
Start: 2018-01-12 | End: 2018-01-12

## 2018-01-12 RX ORDER — MORPHINE SULFATE 2 MG/ML
2 INJECTION, SOLUTION INTRAMUSCULAR; INTRAVENOUS EVERY 2 HOUR PRN
Status: DISCONTINUED | OUTPATIENT
Start: 2018-01-12 | End: 2018-01-13

## 2018-01-12 RX ORDER — PRAVASTATIN SODIUM 10 MG
10 TABLET ORAL NIGHTLY
Status: DISCONTINUED | OUTPATIENT
Start: 2018-01-12 | End: 2018-01-12

## 2018-01-12 RX ORDER — ONDANSETRON 2 MG/ML
4 INJECTION INTRAMUSCULAR; INTRAVENOUS EVERY 6 HOURS PRN
Status: DISCONTINUED | OUTPATIENT
Start: 2018-01-12 | End: 2018-01-15

## 2018-01-12 RX ORDER — FAMOTIDINE 20 MG/1
20 TABLET ORAL DAILY
Status: DISCONTINUED | OUTPATIENT
Start: 2018-01-12 | End: 2018-01-12

## 2018-01-12 RX ORDER — SODIUM CHLORIDE 9 MG/ML
INJECTION, SOLUTION INTRAVENOUS CONTINUOUS
Status: ACTIVE | OUTPATIENT
Start: 2018-01-12 | End: 2018-01-15

## 2018-01-12 RX ORDER — ACETAMINOPHEN 650 MG/1
650 SUPPOSITORY RECTAL EVERY 4 HOURS PRN
Status: DISCONTINUED | OUTPATIENT
Start: 2018-01-12 | End: 2018-01-15

## 2018-01-12 RX ORDER — TIMOLOL MALEATE 5 MG/ML
1 SOLUTION/ DROPS OPHTHALMIC EVERY MORNING
Status: DISCONTINUED | OUTPATIENT
Start: 2018-01-13 | End: 2018-01-15

## 2018-01-12 RX ORDER — ASPIRIN 81 MG/1
324 TABLET, CHEWABLE ORAL ONCE
Status: COMPLETED | OUTPATIENT
Start: 2018-01-12 | End: 2018-01-12

## 2018-01-12 RX ORDER — SENNOSIDES 8.6 MG
17.2 TABLET ORAL NIGHTLY
Status: DISCONTINUED | OUTPATIENT
Start: 2018-01-12 | End: 2018-01-15

## 2018-01-12 RX ORDER — PANTOPRAZOLE SODIUM 20 MG/1
20 TABLET, DELAYED RELEASE ORAL
Status: DISCONTINUED | OUTPATIENT
Start: 2018-01-13 | End: 2018-01-15

## 2018-01-12 RX ORDER — ASPIRIN 325 MG
325 TABLET ORAL DAILY
Status: DISCONTINUED | OUTPATIENT
Start: 2018-01-12 | End: 2018-01-13

## 2018-01-12 RX ORDER — METOCLOPRAMIDE HYDROCHLORIDE 5 MG/ML
10 INJECTION INTRAMUSCULAR; INTRAVENOUS EVERY 8 HOURS PRN
Status: DISCONTINUED | OUTPATIENT
Start: 2018-01-12 | End: 2018-01-15

## 2018-01-12 RX ORDER — MORPHINE SULFATE 2 MG/ML
1 INJECTION, SOLUTION INTRAMUSCULAR; INTRAVENOUS EVERY 2 HOUR PRN
Status: DISCONTINUED | OUTPATIENT
Start: 2018-01-12 | End: 2018-01-13

## 2018-01-12 RX ORDER — ATORVASTATIN CALCIUM 80 MG/1
80 TABLET, FILM COATED ORAL NIGHTLY
Status: DISCONTINUED | OUTPATIENT
Start: 2018-01-12 | End: 2018-01-15

## 2018-01-12 RX ORDER — METOPROLOL SUCCINATE 50 MG/1
50 TABLET, EXTENDED RELEASE ORAL
Status: DISCONTINUED | OUTPATIENT
Start: 2018-01-13 | End: 2018-01-15

## 2018-01-12 RX ORDER — LEVOTHYROXINE SODIUM 0.03 MG/1
25 TABLET ORAL
Status: DISCONTINUED | OUTPATIENT
Start: 2018-01-12 | End: 2018-01-15

## 2018-01-12 RX ORDER — ASPIRIN 300 MG
300 SUPPOSITORY, RECTAL RECTAL DAILY
Status: DISCONTINUED | OUTPATIENT
Start: 2018-01-12 | End: 2018-01-13

## 2018-01-12 RX ORDER — LATANOPROST 50 UG/ML
1 SOLUTION/ DROPS OPHTHALMIC NIGHTLY
Status: DISCONTINUED | OUTPATIENT
Start: 2018-01-12 | End: 2018-01-15

## 2018-01-12 RX ORDER — PROPAFENONE HYDROCHLORIDE 150 MG/1
150 TABLET, FILM COATED ORAL EVERY 8 HOURS
COMMUNITY
End: 2018-01-15

## 2018-01-12 NOTE — ED PROVIDER NOTES
Patient Seen in: [facility] Emergency Department    History   Patient presents with:  Stroke (neurologic)    Stated Complaint: Stroke    HPI    27-year-old female presents emergency department who states started having some slurred speech around 3 P slurred speech  HEENT: Pupils equal react to light extraocular muscles intact no scleral icterus, mucous membranes are moist, there is no erythema or exudate in the posterior pharynx  Neck: Supple no JVD no lymphadenopathy no meningismus no carotid bruit RAINBOW DRAW LAVENDER   RAINBOW DRAW LIGHT GREEN   RAINBOW DRAW GOLD     EKG    Rate, intervals and axes as noted on EKG Report.   Rate: 60  Rhythm: Sinus Rhythm  Reading: Nonspecific intraventricular block         Ct Brain Or Head (39235)    Result Date:

## 2018-01-12 NOTE — ED INITIAL ASSESSMENT (HPI)
PT c/o slurred speech around 1500, PT stays at assisted living facility who called 911 due to PT's speech deficit; EMS rpt PT has no other symptoms and [place] Stroke scale score is 0.  NIH -1

## 2018-01-13 ENCOUNTER — APPOINTMENT (OUTPATIENT)
Dept: CV DIAGNOSTICS | Facility: HOSPITAL | Age: 83
End: 2018-01-13
Attending: Other
Payer: MEDICARE

## 2018-01-13 ENCOUNTER — APPOINTMENT (OUTPATIENT)
Dept: ULTRASOUND IMAGING | Facility: HOSPITAL | Age: 83
End: 2018-01-13
Attending: Other
Payer: MEDICARE

## 2018-01-13 PROBLEM — I10 BENIGN ESSENTIAL HTN: Chronic | Status: ACTIVE | Noted: 2017-10-25

## 2018-01-13 LAB
CHOLEST SMN-MCNC: 111 MG/DL (ref ?–200)
EST. AVERAGE GLUCOSE BLD GHB EST-MCNC: 117 MG/DL (ref 68–126)
FOLATE (FOLIC ACID), SERUM: 4.1 NG/ML (ref 8.7–24)
GLUCOSE BLD-MCNC: 103 MG/DL (ref 65–99)
GLUCOSE BLD-MCNC: 82 MG/DL (ref 65–99)
GLUCOSE BLD-MCNC: 85 MG/DL (ref 65–99)
GLUCOSE BLD-MCNC: 86 MG/DL (ref 65–99)
GLUCOSE BLD-MCNC: 88 MG/DL (ref 65–99)
GLUCOSE BLD-MCNC: 93 MG/DL (ref 65–99)
HAV AB SERPL IA-ACNC: 193 PG/ML (ref 193–986)
HBA1C MFR BLD HPLC: 5.7 % (ref ?–5.7)
HDLC SERPL-MCNC: 46 MG/DL (ref 45–?)
HDLC SERPL: 2.41 {RATIO} (ref ?–4.44)
INR BLD: 1.16 (ref 0.89–1.11)
LDLC SERPL CALC-MCNC: 46 MG/DL (ref ?–130)
NONHDLC SERPL-MCNC: 65 MG/DL (ref ?–130)
PSA SERPL DL<=0.01 NG/ML-MCNC: 14.9 SECONDS (ref 12–14.3)
TRIGL SERPL-MCNC: 96 MG/DL (ref ?–150)
TSI SER-ACNC: 2.57 MIU/ML (ref 0.35–5.5)
VLDLC SERPL CALC-MCNC: 19 MG/DL (ref 5–40)

## 2018-01-13 PROCEDURE — 99204 OFFICE O/P NEW MOD 45 MIN: CPT | Performed by: OTHER

## 2018-01-13 PROCEDURE — 99226 SUBSEQUENT OBSERVATION CARE: CPT | Performed by: HOSPITALIST

## 2018-01-13 PROCEDURE — 93306 TTE W/DOPPLER COMPLETE: CPT | Performed by: OTHER

## 2018-01-13 PROCEDURE — 93880 EXTRACRANIAL BILAT STUDY: CPT | Performed by: OTHER

## 2018-01-13 RX ORDER — ASPIRIN 81 MG/1
81 TABLET, CHEWABLE ORAL DAILY
Status: DISCONTINUED | OUTPATIENT
Start: 2018-01-13 | End: 2018-01-15

## 2018-01-13 NOTE — SLP NOTE
ADULT SWALLOWING EVALUATION    ASSESSMENT    ASSESSMENT/OVERALL IMPRESSION:  B/S swallow eval completed upon receipt of MD order/stroke protocol. 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   New goal   Goal #2 The patient/family/caregiver w

## 2018-01-13 NOTE — CONSULTS
[facility]    PATIENT'S NAME: Flakito Shoulders   ATTENDING PHYSICIAN: SAMANTHA Garcia: Pat Joya M.D.    PATIENT ACCOUNT#:   [de-identified]    LOCATION:  [address] [place] [address]  MEDICAL RECORD #:   [id number]       DATE OF BIRTH:  [birth year]/ visual impairment age related. PAST SURGICAL HISTORY:  Pacemaker insertion. MEDICATIONS:  Eliquis, eye drops, baby aspirin, simvastatin, levothyroxine, lovastatin, Toprol-XL. ALLERGIES:  No drug allergies.     SOCIAL HISTORY:  No tobacco or alcohol observed. Mild degree of atrophy age-related. IMPRESSION AND PLAN:  This patient presented with acute onset of slurred speech and diffuse weakness. Certainly, TIA is a major consideration.   She still has word-finding difficulty but, given the mental

## 2018-01-13 NOTE — PROGRESS NOTES
[facility]  Progress Note     Chestine K[place]uk Patient Status:  Observation    10/13/1927 MRN [id number]   [facility] 7NE-A Attending Smith Leon MD   Hosp Day # 0 PCP MADAI Uribe MD     Chief Complaint: confusion    S: Patient ASSESSMENT / PLAN:     1. ? TIA  1. Neuro status back to baseline  2. Unable to do MRI  3. Repeat CT later today  4. Echo  5. eeg  6. neuo to see  2. UTI  1. Cont. Rocephin  3. A. Fib  1. Cont. Rate control  2. Cont. eliquis  4. HTN  5.  NAI on CKD IV

## 2018-01-13 NOTE — ICD/PM
Device is [employer] Jaden dual lead PPM, placed in 2010    [employer] 2210 serial number [id number]. I called [employer] Jaden Tyrell, and confirmed that this device is NOT MRI compatible.

## 2018-01-13 NOTE — PLAN OF CARE
Assumed care @ 0700. Pt a/o x2-3, oriented to person and place, in and out time and situation. NIH 2 for in and out orientation and hard to find a word. VSS. Atrial paced rhythm on tele. No acute respiratory distress noted. Denies any pain.   Bedrest

## 2018-01-13 NOTE — PLAN OF CARE
Received patient from ED in stable condition. AOx3 - disoriented to time. Wrong day and year. Forgetful. VSS on RA  Neuro checks per order, no deficits noted. NIH=0  UA sample obtained, results relayed to Dr. Geoffrey Goins. Abx started.  Urinary frequency and

## 2018-01-13 NOTE — PROGRESS NOTES
01/13/18 1710   Clinical Encounter Type   Visited With Patient   Routine Visit Introduction   Continue Visiting No   Patient's Supportive Strategies/Resources ( provided brief spiritual care.)   Patient Spiritual Encounters   Spiritual Assessmen

## 2018-01-13 NOTE — H&P
[facility]  History and Physical     Deniz Dobson Patient Status:  Observation    10/13/1927 MRN [id number]   [facility] 7NE-A Attending Meet Pang MD   Hosp Day # 0 PCP Renetta Aceves MD, MADAI     Chief Complaint: Slurred speech medications on file prior to encounter. Current Outpatient Prescriptions on File Prior to Encounter:  triamcinolone acetonide 0.1 % External Cream Apply 1 Application topically 2 (two) times daily.  Disp: 453.6 g Rfl: 2   apixaban 2.5 MG Oral Tab Take 1 t Recent Labs   Lab  01/12/18   1545   GLU  101*   BUN  42*   CREATSERUM  2.91*   CA  8.9   ALB  3.0*   NA  134*   K  5.0   CL  106   CO2  19.0*   ALKPHO  67   AST  14*   ALT  13*   BILT  0.3   TP  6.8       Estimated Creatinine Clearance: 10.6 mL/min

## 2018-01-13 NOTE — OCCUPATIONAL THERAPY NOTE
1000 - Attempted to see pt this AM, pt still on Bed Rest at this time. 1320 - Attempted to see pt this PM, pt with EEG, OT will follow up as pt is able and schedule permits.

## 2018-01-13 NOTE — OCCUPATIONAL THERAPY NOTE
OCCUPATIONAL THERAPY QUICK EVALUATION - INPATIENT    Room Number: 6742/5048-P  Evaluation Date: 1/13/2018     Type of Evaluation: Initial  Presenting Problem: slurred speech    Physician Order: IP Consult to Occupational Therapy  Reason for Therapy:  ADL/I 0  Location: no pain       COGNITION  WFL, slight confusion but this is present at baseline    [facility]  Upper extremity ROM is within functional limits     Upper extremity strength is within functional limits     NEUROLOGICA sleep, work, leisure and social participation.      Patient Complexity  Occupational Profile/Medical History  LOW - Brief history including review of medical or therapy records    Specific performance deficits impacting engagement in ADL/IADL  LOW  1 - 3 pe

## 2018-01-14 ENCOUNTER — APPOINTMENT (OUTPATIENT)
Dept: CT IMAGING | Facility: HOSPITAL | Age: 83
End: 2018-01-14
Attending: HOSPITALIST
Payer: MEDICARE

## 2018-01-14 LAB
GLUCOSE BLD-MCNC: 80 MG/DL (ref 65–99)
GLUCOSE BLD-MCNC: 83 MG/DL (ref 65–99)
GLUCOSE BLD-MCNC: 85 MG/DL (ref 65–99)

## 2018-01-14 PROCEDURE — 99225 SUBSEQUENT OBSERVATION CARE: CPT | Performed by: HOSPITALIST

## 2018-01-14 PROCEDURE — 70450 CT HEAD/BRAIN W/O DYE: CPT | Performed by: HOSPITALIST

## 2018-01-14 PROCEDURE — 99214 OFFICE O/P EST MOD 30 MIN: CPT | Performed by: OTHER

## 2018-01-14 RX ORDER — HYDRALAZINE HYDROCHLORIDE 20 MG/ML
10 INJECTION INTRAMUSCULAR; INTRAVENOUS ONCE
Status: COMPLETED | OUTPATIENT
Start: 2018-01-14 | End: 2018-01-14

## 2018-01-14 RX ORDER — AMIODARONE HYDROCHLORIDE 200 MG/1
200 TABLET ORAL DAILY
Status: DISCONTINUED | OUTPATIENT
Start: 2018-01-14 | End: 2018-01-15

## 2018-01-14 RX ORDER — CYANOCOBALAMIN 1000 UG/ML
1000 INJECTION INTRAMUSCULAR; SUBCUTANEOUS
Status: COMPLETED | OUTPATIENT
Start: 2018-01-14 | End: 2018-01-14

## 2018-01-14 RX ORDER — LOSARTAN POTASSIUM 25 MG/1
25 TABLET ORAL ONCE
Status: COMPLETED | OUTPATIENT
Start: 2018-01-14 | End: 2018-01-14

## 2018-01-14 RX ORDER — AMIODARONE HYDROCHLORIDE 200 MG/1
200 TABLET ORAL DAILY
Qty: 30 TABLET | Refills: 0 | Status: SHIPPED | OUTPATIENT
Start: 2018-01-14 | End: 2018-02-13

## 2018-01-14 RX ORDER — CEPHALEXIN 500 MG/1
500 CAPSULE ORAL 2 TIMES DAILY
Qty: 14 CAPSULE | Refills: 0 | Status: SHIPPED | OUTPATIENT
Start: 2018-01-14 | End: 2018-01-21

## 2018-01-14 RX ORDER — UBIDECARENONE 75 MG
100 CAPSULE ORAL DAILY
Status: DISCONTINUED | OUTPATIENT
Start: 2018-01-14 | End: 2018-01-15

## 2018-01-14 RX ORDER — LOSARTAN POTASSIUM 25 MG/1
25 TABLET ORAL DAILY
Status: DISCONTINUED | OUTPATIENT
Start: 2018-01-14 | End: 2018-01-14

## 2018-01-14 RX ORDER — PROPAFENONE HYDROCHLORIDE 150 MG/1
150 TABLET, FILM COATED ORAL EVERY 8 HOURS SCHEDULED
Status: DISCONTINUED | OUTPATIENT
Start: 2018-01-14 | End: 2018-01-14

## 2018-01-14 NOTE — PHYSICAL THERAPY NOTE
PHYSICAL THERAPY EVALUATION - INPATIENT     Room Number: 7269/6763-J  Evaluation Date: 1/14/2018  Type of Evaluation: Initial  Physician Order: PT Eval and Treat    Presenting Problem: slurred speech, possible TIA  Reason for Therapy: Mobility Dysfunct None                PAIN ASSESSMENT  Ratin          COGNITION  · Overall Cognitive Status:  Impaired  · Safety Judgement:  decreased awareness of need for safety    RANGE OF MOTION AND STRENGTH ASSESSMENT  Upper extremity ROM and strength are within fu carryover. Pt tolerates ambulation x 200 feet with rolling walker and supervision; no loss of balance noted; pt demonstrates uneven baljinder with wide base of support; verbal cues for heel strike for improved foot clearance.  Discussed PT POC and discharge r RECOMMENDATIONS  PT Discharge Recommendations: Home with home health PT    PLAN  PT Treatment Plan: Endurance; Energy conservation;Patient education;Gait training;Neuromuscular re-educate;Transfer training;Balance training  Rehab Potential : Good  Frequency

## 2018-01-14 NOTE — CM/SW NOTE
sw notified that is ready for discharge today back to [facility] and needs hhc and possibly additional supervision at the facility or increased care.  doc spoke to son who confirms pt will return today to [facility] and is agreeable to hh

## 2018-01-14 NOTE — HOME CARE LIAISON
Received a referral from [facility], [address] [place] [address] pt's son, Nora uBstillos to discuss home health services for his mother. He is agreeable to [facility] services at this time. Will follow pt.  Thank you for the referral.

## 2018-01-14 NOTE — PLAN OF CARE
Assumed care at [address]. Pt A/O x2. Oriented to person and place. Disorietned to time and situation. VSS. Neuros q4. Slurred speech and  L Ataxia noted. Pt denies any pain. Plan for repeat CT in the AM.  Call light within reach. Will continue to monitor.

## 2018-01-14 NOTE — PLAN OF CARE
Assumed care @ 0700. Pt a/o x3, forgetful at times. Neuro checks q4, no new deficits. NIH 1-2, slurred speech resolved compared to this AM, still has word-finding issue, improving. VSS. Atrial-paced rhyhm on tele. No acute respiratory distress noted.

## 2018-01-14 NOTE — PROCEDURES
[address]      PATIENT'S NAME: Cassidy michelle   ATTENDING PHYSICIAN: Andrew Connell M.D.    PATIENT ACCOUNT #: [de-identified] LOCATION: [address] [place] [address]   MEDICAL RECORD #: [id number] DATE OF BIRTH:

## 2018-01-14 NOTE — PROGRESS NOTES
[id number] [facility] Progress Note    Cloretta Eder Patient Status:  Observation    10/13/1927 MRN [id number]   [facility] 7NE-A Attending Nikki Alonzo MD   Hosp Day # 0  [address] [place] [address], [address] [place] [address]     Patient Identifier:  Alvin Baker improved, patient at or nearly at baseline with unremarkable work up thus far. Likely this is TME related to underlying infection.     Plan:  Needs to repeat CTH imaging today (planning for around 1 pm)  TIA work up completed   - continue ASA 81 mg    - Co

## 2018-01-14 NOTE — SLP NOTE
SPEECH DAILY NOTE - INPATIENT    ASSESSMENT & PLAN   ASSESSMENT  Pt seen for meal assess/dysphagia therapy to monitor po tolerance of recommended diet and ensure adherence to aspiration precautions. Pt alert and sitting upright in chair.   Pt requesting as likely TME related to underlying infection. Neuro has cleared pt to go home if repeat CT scan negative.   ST will sign off at this time        FOLLOW UP  Follow Up Needed: No  SLP Follow-up Date:  (d/c ST)  Number of Visits to Meet Established Goals:  (1-2

## 2018-01-14 NOTE — PROGRESS NOTES
[facility] HOSPITALIST  Progress Note     Brian Harm Patient Status:  Observation    10/13/1927 MRN [id number]   [facility] 7NE-A Attending Bhumika Potter MD   Hosp Day # 0  Th  MADAI HERNANDEZ     Chief Complaint: confusion    S: Patient' Nightly       ASSESSMENT / PLAN:     1. Acute encephalopathy  1. Likely TME from UTI  2. Repeat head CT today to r/o CVA  3. Neuro status fluctuating  4. EEG neg  5. Echo EF NL, mod to severe TR  6. neuo following  2. UTI  1. Cont.  Rocephin  2. DC on kefle

## 2018-01-14 NOTE — PROGRESS NOTES
01/14/18 1324   Clinical Encounter Type   Visited With Patient   Routine Visit Follow-up   Continue Visiting No   Patient Spiritual Encounters   Spiritual Interventions  provided emotional support and blessing

## 2018-01-15 VITALS
HEART RATE: 83 BPM | BODY MASS INDEX: 24.57 KG/M2 | RESPIRATION RATE: 18 BRPM | SYSTOLIC BLOOD PRESSURE: 175 MMHG | HEIGHT: 63 IN | DIASTOLIC BLOOD PRESSURE: 53 MMHG | TEMPERATURE: 97 F | WEIGHT: 138.69 LBS | OXYGEN SATURATION: 96 %

## 2018-01-15 PROCEDURE — 99217 OBSERVATION CARE DISCHARGE: CPT | Performed by: HOSPITALIST

## 2018-01-15 RX ORDER — HYDRALAZINE HYDROCHLORIDE 20 MG/ML
INJECTION INTRAMUSCULAR; INTRAVENOUS
Status: DISCONTINUED
Start: 2018-01-15 | End: 2018-01-15

## 2018-01-15 RX ORDER — ONDANSETRON 2 MG/ML
4 INJECTION INTRAMUSCULAR; INTRAVENOUS EVERY 6 HOURS PRN
Status: DISCONTINUED | OUTPATIENT
Start: 2018-01-15 | End: 2018-01-15

## 2018-01-15 RX ORDER — LOSARTAN POTASSIUM 25 MG/1
25 TABLET ORAL NIGHTLY
Status: DISCONTINUED | OUTPATIENT
Start: 2018-01-15 | End: 2018-01-15

## 2018-01-15 RX ORDER — LOSARTAN POTASSIUM 25 MG/1
25 TABLET ORAL NIGHTLY
Qty: 30 TABLET | Refills: 0 | Status: SHIPPED | OUTPATIENT
Start: 2018-01-15 | End: 2018-01-15

## 2018-01-15 RX ORDER — METOCLOPRAMIDE HYDROCHLORIDE 5 MG/ML
5 INJECTION INTRAMUSCULAR; INTRAVENOUS EVERY 8 HOURS PRN
Status: DISCONTINUED | OUTPATIENT
Start: 2018-01-15 | End: 2018-01-15

## 2018-01-15 RX ORDER — AMLODIPINE BESYLATE 10 MG/1
5 TABLET ORAL NIGHTLY
Qty: 15 TABLET | Refills: 0 | Status: SHIPPED | OUTPATIENT
Start: 2018-01-15 | End: 2018-02-14

## 2018-01-15 NOTE — PROGRESS NOTES
Patient seen and examined. Medically clear to discharge today. BP elevated overnight. Better now. Add nightly norvasc.     Nona García MD

## 2018-01-15 NOTE — CARDIAC REHAB
Consult received for stroke education. Clinical notes reviewed- altered mental status related to underlying infection. CT negative thus far. Will hold off on stroke education for now . Call x [phone] if anything changes.

## 2018-01-15 NOTE — CM/SW NOTE
Pt is ready for d/c today. Pt will return to [facility] with [facility]. Katarzyna aware of pt's d/c today.

## 2018-01-15 NOTE — PLAN OF CARE
Pt d/c home via wheelchair. Son at bedside. All d/c instructions, rx and med lists given and discussed. All questions encouraged and answered.

## 2018-01-15 NOTE — PLAN OF CARE
One time dose of losartan given at 8pm for continued elevated BP. Last two BPs within normal with latest 119/59. Speech clear without word finding difficulties. Afebrile. Apaced with controlled rates. Lungs clear. No cough. Room air.   Forgetful of

## 2018-01-15 NOTE — PROGRESS NOTES
Repeat CT neg this PM, Dr. Melissa Ewing and Dr. Britta White notified. Both ok to discharge pt. Noticed -180's, Dr. Britta White notified and one time hydralazine given per order. Repeat -170's.   Pt's son asked if pt can be discharged in AM.  Dr. Britta White no

## 2018-01-16 NOTE — DISCHARGE SUMMARY
[facility] HOSPITAL[facility]  DISCHARGE SUMMARY     Marcy Larose Patient Status:  Observation    10/13/1927 MRN [id number]   [facility] 7NE-A Attending No att. providers found   Hosp Day # 0 PCP Darren Ireland MD, [address] [place] [address]     Date of Admission: 2018  Da serial head CTs which were unremarkable. She was found to have a urinary tract infection. Her mental status improved with IV fluids and IV antibiotics. It is felt that the patient likely had acute encephalopathy secondary to TME from her UTI.   During th Refills:  0     Levothyroxine Sodium 25 MCG Tabs  Commonly known as:  SYNTHROID, LEVOTHROID      Take 25 mcg by mouth before breafast.   Refills:  0     Lovastatin 20 MG Tabs      Take 20 mg by mouth nightly.    Refills:  0     Metoprolol Succinate ER 50 MG 3 weeks  Follow up in 3-4 weeks      Vital signs:  Temp:  [97.4 °F (36.3 °C)-98 °F (36.7 °C)] 97.4 °F (36.3 °C)  Pulse:  [60-83] 83  Resp:  [17-18] 18  BP: (140-184)/(47-58) 175/53    Physical Exam:    General: No acute distress.    Respiratory: Clear to au

## 2018-01-16 NOTE — CM/SW NOTE
01/16/18 0900   Discharge disposition   Name of Facillity/Home Care/Hospice [address] Provider Residential   Home services after discharge Skilled home care   Discharge transportation Private car

## 2018-01-18 ENCOUNTER — CHARTING TRANS (OUTPATIENT)
Dept: OTHER | Age: 83
End: 2018-01-18

## 2018-01-20 ENCOUNTER — CHARTING TRANS (OUTPATIENT)
Dept: OTHER | Age: 83
End: 2018-01-20

## 2018-01-23 ENCOUNTER — CHARTING TRANS (OUTPATIENT)
Dept: OTHER | Age: 83
End: 2018-01-23

## 2018-02-02 ENCOUNTER — CHARTING TRANS (OUTPATIENT)
Dept: OTHER | Age: 83
End: 2018-02-02

## 2018-02-03 ENCOUNTER — CHARTING TRANS (OUTPATIENT)
Dept: OTHER | Age: 83
End: 2018-02-03

## 2018-02-14 ENCOUNTER — PRIOR ORIGINAL RECORDS (OUTPATIENT)
Dept: OTHER | Age: 83
End: 2018-02-14

## 2018-03-16 ENCOUNTER — CHARTING TRANS (OUTPATIENT)
Dept: OTHER | Age: 83
End: 2018-03-16

## 2018-03-26 ENCOUNTER — CHARTING TRANS (OUTPATIENT)
Dept: OTHER | Age: 83
End: 2018-03-26

## 2018-04-26 ENCOUNTER — CHARTING TRANS (OUTPATIENT)
Dept: OTHER | Age: 83
End: 2018-04-26

## 2018-05-02 ENCOUNTER — CHARTING TRANS (OUTPATIENT)
Dept: OTHER | Age: 83
End: 2018-05-02

## 2018-05-05 ENCOUNTER — CHARTING TRANS (OUTPATIENT)
Dept: OTHER | Age: 83
End: 2018-05-05

## 2018-05-07 ENCOUNTER — LAB SERVICES (OUTPATIENT)
Dept: OTHER | Age: 83
End: 2018-05-07

## 2018-05-07 ENCOUNTER — CHARTING TRANS (OUTPATIENT)
Dept: OTHER | Age: 83
End: 2018-05-07

## 2018-05-07 ENCOUNTER — ANCILLARY ORDERS (OUTPATIENT)
Dept: OTHER | Age: 83
End: 2018-05-07

## 2018-05-07 DIAGNOSIS — N28.9 DISORDER OF KIDNEY AND URETER: ICD-10-CM

## 2018-05-07 LAB
ALBUMIN SERPL BCG-MCNC: 4.2 G/DL (ref 3.6–5.1)
ALP SERPL-CCNC: 59 U/L (ref 45–105)
ALT SERPL W/O P-5'-P-CCNC: 6 U/L (ref 7–34)
AST SERPL-CCNC: 13 U/L (ref 9–37)
BILIRUB SERPL-MCNC: 0.3 MG/DL (ref 0–1)
BNP BLD-MCNC: 615 PG/ML (ref 0–100)
BUN SERPL-MCNC: 36 MG/DL (ref 7–20)
CALCIUM SERPL-MCNC: 9.8 MG/DL (ref 8.6–10.6)
CHLORIDE SERPL-SCNC: 102 MMOL/L (ref 96–107)
CREAT SERPL-MCNC: 2.8 MG/DL (ref 0.5–1.4)
D DIMER PPP IA.DDU-MCNC: 345 NG/ML (ref 0–400)
DIFFERENTIAL TYPE: ABNORMAL
GFR SERPL CREATININE-BSD FRML MDRD: 16 ML/MIN/{1.73M2}
GFR SERPL CREATININE-BSD FRML MDRD: 19 ML/MIN/{1.73M2}
GLUCOSE SERPL-MCNC: 94 MG/DL (ref 70–200)
HCO3 SERPL-SCNC: 27 MMOL/L (ref 22–32)
HEMATOCRIT: 41.1 % (ref 34–45)
HEMOGLOBIN: 13.2 G/DL (ref 11.2–15.7)
LYMPH PERCENT: 11.8 % (ref 20.5–51.1)
LYMPHOCYTE ABSOLUTE #: 1.2 10*3/UL (ref 1.2–3.4)
MEAN CORPUSCULAR HGB CONCENTRATION: 32.1 % (ref 32–36)
MEAN CORPUSCULAR HGB: 30.6 PG (ref 27–34)
MEAN CORPUSCULAR VOLUME: 95.4 FL (ref 79–95)
MEAN PLATELET VOLUME: 11.2 FL (ref 8.6–12.4)
MIXED %: 9.3 % (ref 4.3–12.9)
MIXED ABSOLUTE #: 0.9 10*3/UL (ref 0.2–0.9)
NEUTROPHIL ABSOLUTE #: 8 10*3/UL (ref 1.4–6.5)
NEUTROPHIL PERCENT: 78.9 % (ref 34–73.5)
PLATELET COUNT: 253 10*3/UL (ref 150–400)
POTASSIUM SERPL-SCNC: 5.4 MMOL/L (ref 3.5–5.3)
PROT SERPL-MCNC: 6.7 G/DL (ref 6.2–8.1)
RED BLOOD CELL COUNT: 4.31 10*6/UL (ref 3.7–5.2)
RED CELL DISTRIBUTION WIDTH: 14.4 % (ref 11.3–14.8)
SODIUM SERPL-SCNC: 139 MMOL/L (ref 136–146)
TSH SERPL DL<=0.05 MIU/L-ACNC: 3.88 M[IU]/L (ref 0.3–4.82)
WHITE BLOOD CELL COUNT: 10.1 10*3/UL (ref 4–10)

## 2018-05-08 ENCOUNTER — CHARTING TRANS (OUTPATIENT)
Dept: OTHER | Age: 83
End: 2018-05-08

## 2018-05-14 ENCOUNTER — PRIOR ORIGINAL RECORDS (OUTPATIENT)
Dept: OTHER | Age: 83
End: 2018-05-14

## 2018-05-16 ENCOUNTER — PRIOR ORIGINAL RECORDS (OUTPATIENT)
Dept: OTHER | Age: 83
End: 2018-05-16

## 2018-05-29 ENCOUNTER — CHARTING TRANS (OUTPATIENT)
Dept: OTHER | Age: 83
End: 2018-05-29

## 2018-05-30 ENCOUNTER — PRIOR ORIGINAL RECORDS (OUTPATIENT)
Dept: OTHER | Age: 83
End: 2018-05-30

## 2018-08-07 ENCOUNTER — CHARTING TRANS (OUTPATIENT)
Dept: OTHER | Age: 83
End: 2018-08-07

## 2018-10-14 ENCOUNTER — HOSPITAL ENCOUNTER (EMERGENCY)
Facility: HOSPITAL | Age: 83
Discharge: HOME | End: 2018-10-14
Attending: EMERGENCY MEDICINE
Payer: MEDICARE

## 2018-10-14 VITALS
BODY MASS INDEX: 19.99 KG/M2 | WEIGHT: 120 LBS | DIASTOLIC BLOOD PRESSURE: 56 MMHG | TEMPERATURE: 97 F | OXYGEN SATURATION: 98 % | HEART RATE: 64 BPM | HEIGHT: 65 IN | RESPIRATION RATE: 18 BRPM | SYSTOLIC BLOOD PRESSURE: 138 MMHG

## 2018-10-14 DIAGNOSIS — N30.00 ACUTE CYSTITIS WITHOUT HEMATURIA: Primary | ICD-10-CM

## 2018-10-14 PROCEDURE — 96365 THER/PROPH/DIAG IV INF INIT: CPT

## 2018-10-14 PROCEDURE — 85025 COMPLETE CBC W/AUTO DIFF WBC: CPT | Performed by: EMERGENCY MEDICINE

## 2018-10-14 PROCEDURE — 83605 ASSAY OF LACTIC ACID: CPT | Performed by: EMERGENCY MEDICINE

## 2018-10-14 PROCEDURE — 81001 URINALYSIS AUTO W/SCOPE: CPT

## 2018-10-14 PROCEDURE — 81001 URINALYSIS AUTO W/SCOPE: CPT | Performed by: EMERGENCY MEDICINE

## 2018-10-14 PROCEDURE — 99284 EMERGENCY DEPT VISIT MOD MDM: CPT

## 2018-10-14 PROCEDURE — 87086 URINE CULTURE/COLONY COUNT: CPT | Performed by: EMERGENCY MEDICINE

## 2018-10-14 PROCEDURE — 80053 COMPREHEN METABOLIC PANEL: CPT | Performed by: EMERGENCY MEDICINE

## 2018-10-14 RX ORDER — ONDANSETRON 4 MG/1
4 TABLET, ORALLY DISINTEGRATING ORAL EVERY 4 HOURS PRN
Qty: 10 TABLET | Refills: 0 | Status: SHIPPED | OUTPATIENT
Start: 2018-10-14 | End: 2018-10-21

## 2018-10-14 RX ORDER — CEPHALEXIN 250 MG/1
250 CAPSULE ORAL 4 TIMES DAILY
Qty: 28 CAPSULE | Refills: 0 | Status: SHIPPED | OUTPATIENT
Start: 2018-10-14 | End: 2018-10-21

## 2018-10-14 NOTE — ED INITIAL ASSESSMENT (HPI)
Pt presents to ED via EMS from [facility]. Pt with complaint of urinary symptoms x4 days.  Reports frequency and burning with urination

## 2018-10-14 NOTE — ED NOTES
Patient escorted to lobby to wait for taxi. Patient ambulatory with steady gait. Provided warm blanket for privacy while waiting.

## 2018-10-14 NOTE — ED PROVIDER NOTES
Patient Seen in: [facility] Emergency Department    History   Patient presents with:  Urinary Symptoms (urologic)    Stated Complaint: UTI symptoms x4 days    HPI    Patient with urinary symptoms for the past 4 days.   She is in the independent [facility] comfortable, no acute distress, elderly, coherent, able to give her entire history  Head and neck: Normocephalic, atraumatic, pupils equal round and reactive to light, oropharynx clear   Neck: No JVD, no lymphadenopathy, no tenderness, swelling, deformity 0    ondansetron 4 MG Oral Tablet Dispersible  Take 1 tablet (4 mg total) by mouth every 4 (four) hours as needed for Nausea.   Qty: 10 tablet Refills: 0

## 2018-10-14 NOTE — ED NOTES
[address] [facility] to call in patient's prescriptions for Keflex and Zofran. Left message at [employer] on [address] [facility] and [address] in [place].

## 2018-10-14 NOTE — ED NOTES
[address] to see if they have anyone who could transport patient. They do not have transportation service on the weekends.  Told patient we would have to contact taxi service to transport patient if we were unable to contact another mod

## 2018-10-14 NOTE — ED NOTES
Pt ambulatory to restroom with steady gait accompanied by this RN.  Clean catch urine sample obtained and sent to lab

## 2018-10-29 ENCOUNTER — MYAURORA ACCOUNT LINK (OUTPATIENT)
Dept: OTHER | Age: 83
End: 2018-10-29

## 2018-11-21 ENCOUNTER — LAB REQUISITION (OUTPATIENT)
Dept: LAB | Facility: HOSPITAL | Age: 83
End: 2018-11-21
Payer: MEDICARE

## 2018-11-21 DIAGNOSIS — I10 ESSENTIAL (PRIMARY) HYPERTENSION: ICD-10-CM

## 2018-11-21 DIAGNOSIS — N18.4 CHRONIC KIDNEY DISEASE, STAGE IV (SEVERE) (HCC): ICD-10-CM

## 2018-11-21 PROCEDURE — 87086 URINE CULTURE/COLONY COUNT: CPT

## 2018-11-21 PROCEDURE — 81001 URINALYSIS AUTO W/SCOPE: CPT

## 2018-11-28 VITALS
HEART RATE: 69 BPM | WEIGHT: 146 LBS | OXYGEN SATURATION: 96 % | HEIGHT: 63 IN | SYSTOLIC BLOOD PRESSURE: 110 MMHG | BODY MASS INDEX: 25.87 KG/M2 | TEMPERATURE: 97.1 F | DIASTOLIC BLOOD PRESSURE: 60 MMHG

## 2018-11-28 VITALS
TEMPERATURE: 98.1 F | WEIGHT: 154 LBS | HEIGHT: 63 IN | SYSTOLIC BLOOD PRESSURE: 140 MMHG | BODY MASS INDEX: 27.29 KG/M2 | DIASTOLIC BLOOD PRESSURE: 82 MMHG | HEART RATE: 70 BPM

## 2018-12-02 ENCOUNTER — APPOINTMENT (OUTPATIENT)
Dept: GENERAL RADIOLOGY | Facility: HOSPITAL | Age: 83
End: 2018-12-02
Attending: EMERGENCY MEDICINE
Payer: MEDICARE

## 2018-12-02 ENCOUNTER — HOSPITAL ENCOUNTER (EMERGENCY)
Facility: HOSPITAL | Age: 83
Discharge: HOME | End: 2018-12-02
Attending: EMERGENCY MEDICINE
Payer: MEDICARE

## 2018-12-02 VITALS
DIASTOLIC BLOOD PRESSURE: 52 MMHG | HEART RATE: 68 BPM | OXYGEN SATURATION: 98 % | TEMPERATURE: 98 F | SYSTOLIC BLOOD PRESSURE: 116 MMHG | RESPIRATION RATE: 16 BRPM

## 2018-12-02 DIAGNOSIS — N39.0 URINARY TRACT INFECTION WITHOUT HEMATURIA, SITE UNSPECIFIED: Primary | ICD-10-CM

## 2018-12-02 PROCEDURE — 93010 ELECTROCARDIOGRAM REPORT: CPT

## 2018-12-02 PROCEDURE — 87077 CULTURE AEROBIC IDENTIFY: CPT | Performed by: EMERGENCY MEDICINE

## 2018-12-02 PROCEDURE — 87186 SC STD MICRODIL/AGAR DIL: CPT | Performed by: EMERGENCY MEDICINE

## 2018-12-02 PROCEDURE — 99285 EMERGENCY DEPT VISIT HI MDM: CPT

## 2018-12-02 PROCEDURE — 87086 URINE CULTURE/COLONY COUNT: CPT | Performed by: EMERGENCY MEDICINE

## 2018-12-02 PROCEDURE — 96361 HYDRATE IV INFUSION ADD-ON: CPT

## 2018-12-02 PROCEDURE — 71045 X-RAY EXAM CHEST 1 VIEW: CPT | Performed by: EMERGENCY MEDICINE

## 2018-12-02 PROCEDURE — 85025 COMPLETE CBC W/AUTO DIFF WBC: CPT | Performed by: EMERGENCY MEDICINE

## 2018-12-02 PROCEDURE — 84484 ASSAY OF TROPONIN QUANT: CPT | Performed by: EMERGENCY MEDICINE

## 2018-12-02 PROCEDURE — 93005 ELECTROCARDIOGRAM TRACING: CPT

## 2018-12-02 PROCEDURE — 81001 URINALYSIS AUTO W/SCOPE: CPT | Performed by: EMERGENCY MEDICINE

## 2018-12-02 PROCEDURE — 83605 ASSAY OF LACTIC ACID: CPT | Performed by: EMERGENCY MEDICINE

## 2018-12-02 PROCEDURE — 96365 THER/PROPH/DIAG IV INF INIT: CPT

## 2018-12-02 PROCEDURE — 87184 SC STD DISK METHOD PER PLATE: CPT | Performed by: EMERGENCY MEDICINE

## 2018-12-02 PROCEDURE — 80053 COMPREHEN METABOLIC PANEL: CPT | Performed by: EMERGENCY MEDICINE

## 2018-12-02 RX ORDER — SODIUM CHLORIDE 9 MG/ML
125 INJECTION, SOLUTION INTRAVENOUS CONTINUOUS
Status: DISCONTINUED | OUTPATIENT
Start: 2018-12-02 | End: 2018-12-02

## 2018-12-02 RX ORDER — CEFADROXIL 500 MG/1
500 CAPSULE ORAL 2 TIMES DAILY
Qty: 20 CAPSULE | Refills: 0 | Status: SHIPPED | OUTPATIENT
Start: 2018-12-02 | End: 2018-12-12

## 2018-12-02 RX ORDER — LEVOFLOXACIN 500 MG/1
500 TABLET, FILM COATED ORAL DAILY
COMMUNITY
End: 2020-09-22

## 2018-12-02 NOTE — ED PROVIDER NOTES
Patient Seen in: [facility] Emergency Department    History   Patient presents with:  Fatigue (constitutional, neurologic)    Stated Complaint: fatigue    HPI    This is a 80-year-old female complaining of fatigue.   The patient is somewhat poor hist distress.   HEENT exam oral mucosa is moist pupils are equal round react light extra muscles are intact the neck there is no lymphadenopathy or JVD  Lungs are clear to auscultation  Cardiovascular exam shows regular rate and rhythm without murmurs Pranactin individual orders. RAINBOW DRAW LAVENDER   RAINBOW DRAW LIGHT GREEN   RAINBOW DRAW GOLD   RAINBOW DRAW LIGHT GREEN   URINE CULTURE, ROUTINE     EKG    Rate, intervals and axes as noted on EKG Report.   Rate: 63  Rhythm: Atrial paced rhythm  Reading: Atria

## 2018-12-02 NOTE — ED NOTES
DC instructions and RX handed to pt. No distress noted. Pt dressed back in home clothing. Pt has purse and jacket with her. Updated on eta for jai.  Pt finished food tray

## 2018-12-02 NOTE — ED NOTES
Spoke to pt regarding transport home. Pt sts family is all out of town. Pt requests [facility].  informed. [facility] Pt informed she will be charged for transport. Pt verbalizes understanding.

## 2018-12-04 ENCOUNTER — OFFICE VISIT (OUTPATIENT)
Dept: NEPHROLOGY | Facility: CLINIC | Age: 83
End: 2018-12-04
Payer: COMMERCIAL

## 2018-12-04 VITALS — DIASTOLIC BLOOD PRESSURE: 60 MMHG | WEIGHT: 129 LBS | BODY MASS INDEX: 21 KG/M2 | SYSTOLIC BLOOD PRESSURE: 108 MMHG

## 2018-12-04 DIAGNOSIS — N18.4 CKD (CHRONIC KIDNEY DISEASE) STAGE 4, GFR 15-29 ML/MIN (HCC): Primary | ICD-10-CM

## 2018-12-04 DIAGNOSIS — I10 ESSENTIAL HYPERTENSION: ICD-10-CM

## 2018-12-04 PROCEDURE — 99203 OFFICE O/P NEW LOW 30 MIN: CPT | Performed by: INTERNAL MEDICINE

## 2018-12-04 RX ORDER — MEMANTINE HYDROCHLORIDE 10 MG/1
10 TABLET ORAL DAILY
COMMUNITY

## 2018-12-04 RX ORDER — AMIODARONE HYDROCHLORIDE 200 MG/1
200 TABLET ORAL DAILY
COMMUNITY
End: 2020-09-22

## 2018-12-04 RX ORDER — CALCIUM ACETATE 667 MG/1
667 CAPSULE ORAL 4 TIMES DAILY
COMMUNITY

## 2018-12-04 RX ORDER — AMLODIPINE BESYLATE 5 MG/1
5 TABLET ORAL EVERY EVENING
Status: ON HOLD | COMMUNITY
End: 2021-03-12

## 2018-12-04 NOTE — PROGRESS NOTES
Consult Note      REASON FOR CONSULT:  CKD IV         HPI:   Fatoumata Young is a 80year old female with Patient presents with:  Chronic Kidney Disease  Hypertension  Proteinuria    MD Josse Callahan was seen in the nephrology clinic today mastoid as a child         Medications (Active prior to today's visit):    Current Outpatient Medications:  Mirabegron ER (MYRBETRIQ) 50 MG Oral Tablet 24 Hr Take 50 mg by mouth daily.  Disp:  Rfl:    amiodarone HCl 200 MG Oral Tab Take 200 mg by mouth janice Socioeconomic History      Marital status:        Spouse name: Not on file      Number of children: Not on file      Years of education: Not on file      Highest education level: Not on file    Tobacco Use      Smoking status: Never Smoker      Smoke 168.0 12/02/2018    MPV 11.5 (H) 08/02/2012    MCV 95.7 12/02/2018    MCH 31.2 12/02/2018    MCHC 32.6 12/02/2018    RDW 15.3 12/02/2018    NEPRELIM 4.70 12/02/2018    NEPERCENT 72.0 12/02/2018    LYPERCENT 12.3 12/02/2018    MOPERCENT 10.9 12/02/2018    E regimen. The above was discussed at length with the patient the office today although recall of our conversation was very poor and I suspect that the majority of this discussion was not understood by her.   Again she has stable chronic kidney disease and

## 2018-12-04 NOTE — PROGRESS NOTES
[facility]  Report of Consultation    Adore Deluca Patient Status:  No patient class for patient encounter    10/13/1927 MRN [id number]   [facility] [address] [facility] 75 Attending No att. providers found   Hosp Day # 0 PCP Amador Nye Tab Take 1 tablet by mouth daily. apixaban 2.5 MG Oral Tab Take 1 tablet (2.5 mg total) by mouth 2 (two) times daily. Timolol Maleate 0.5 % Ophthalmic Solution Place 1 drop into both eyes every morning.    latanoprost 0.005 % Ophthalmic Solution Place 1 (H)   10/14/2018 2.42 (H)   2018 2.91 (H)       No results found for: MICROALBCREA    @LABRCNTIP[WBC:5,HGB:5,MCV:5,PLT:5,BAND:5,LYM#:5,MONO#:5,BASOS#:5,EOSIN#:5,INR:5@    @LABRCNTIP[NA:5,K:5,CL:5,CO2:5,BUN:5,CREATSERUM:5,CA:5,CAION:5,M,PHOS:5,GLU:

## 2018-12-06 RX ORDER — SULFAMETHOXAZOLE AND TRIMETHOPRIM 800; 160 MG/1; MG/1
1 TABLET ORAL 2 TIMES DAILY
Qty: 14 TABLET | Refills: 0 | Status: SHIPPED | OUTPATIENT
Start: 2018-12-06 | End: 2018-12-13

## 2019-02-28 VITALS
HEIGHT: 63 IN | DIASTOLIC BLOOD PRESSURE: 60 MMHG | SYSTOLIC BLOOD PRESSURE: 120 MMHG | WEIGHT: 150 LBS | HEART RATE: 60 BPM | BODY MASS INDEX: 26.58 KG/M2

## 2019-02-28 VITALS
BODY MASS INDEX: 24.27 KG/M2 | SYSTOLIC BLOOD PRESSURE: 124 MMHG | HEIGHT: 63 IN | DIASTOLIC BLOOD PRESSURE: 58 MMHG | WEIGHT: 137 LBS | HEART RATE: 62 BPM

## 2019-03-06 VITALS
HEART RATE: 62 BPM | OXYGEN SATURATION: 96 % | DIASTOLIC BLOOD PRESSURE: 56 MMHG | TEMPERATURE: 97 F | SYSTOLIC BLOOD PRESSURE: 118 MMHG | HEIGHT: 63 IN | WEIGHT: 138 LBS | BODY MASS INDEX: 24.45 KG/M2

## 2019-03-06 VITALS
DIASTOLIC BLOOD PRESSURE: 70 MMHG | SYSTOLIC BLOOD PRESSURE: 138 MMHG | HEART RATE: 68 BPM | WEIGHT: 138 LBS | BODY MASS INDEX: 24.45 KG/M2 | TEMPERATURE: 98.6 F

## 2019-03-29 RX ORDER — METOPROLOL SUCCINATE 50 MG/1
TABLET, EXTENDED RELEASE ORAL
COMMUNITY

## 2019-03-29 RX ORDER — AMIODARONE HYDROCHLORIDE 200 MG/1
TABLET ORAL
COMMUNITY
Start: 2018-05-16 | End: 2020-01-01

## 2019-03-29 RX ORDER — CHLORAL HYDRATE 500 MG
CAPSULE ORAL
COMMUNITY

## 2019-03-29 RX ORDER — AMLODIPINE BESYLATE 10 MG/1
TABLET ORAL
COMMUNITY
Start: 2018-05-16 | End: 2020-01-01

## 2019-03-29 RX ORDER — LATANOPROST 50 UG/ML
SOLUTION/ DROPS OPHTHALMIC
COMMUNITY
Start: 2018-05-16

## 2019-03-29 RX ORDER — PANTOPRAZOLE SODIUM 20 MG/1
TABLET, DELAYED RELEASE ORAL
COMMUNITY
End: 2020-01-01

## 2019-03-29 RX ORDER — OMEGA-3 FATTY ACIDS CAP DELAYED RELEASE 1000 MG 1000 MG
CAPSULE DELAYED RELEASE ORAL
COMMUNITY
Start: 2018-05-16 | End: 2019-03-29 | Stop reason: CLARIF

## 2019-03-29 RX ORDER — LOVASTATIN 20 MG/1
TABLET ORAL
COMMUNITY
End: 2020-01-01

## 2019-04-16 ENCOUNTER — HOSPITAL ENCOUNTER (EMERGENCY)
Facility: HOSPITAL | Age: 84
Discharge: HOME | End: 2019-04-16
Attending: EMERGENCY MEDICINE
Payer: MEDICARE

## 2019-04-16 VITALS
HEART RATE: 60 BPM | OXYGEN SATURATION: 98 % | TEMPERATURE: 98 F | WEIGHT: 125 LBS | SYSTOLIC BLOOD PRESSURE: 132 MMHG | HEIGHT: 65 IN | RESPIRATION RATE: 20 BRPM | DIASTOLIC BLOOD PRESSURE: 68 MMHG | BODY MASS INDEX: 20.83 KG/M2

## 2019-04-16 DIAGNOSIS — N30.00 ACUTE CYSTITIS WITHOUT HEMATURIA: Primary | ICD-10-CM

## 2019-04-16 PROCEDURE — 85025 COMPLETE CBC W/AUTO DIFF WBC: CPT | Performed by: EMERGENCY MEDICINE

## 2019-04-16 PROCEDURE — 87077 CULTURE AEROBIC IDENTIFY: CPT | Performed by: EMERGENCY MEDICINE

## 2019-04-16 PROCEDURE — 99284 EMERGENCY DEPT VISIT MOD MDM: CPT

## 2019-04-16 PROCEDURE — 87086 URINE CULTURE/COLONY COUNT: CPT | Performed by: EMERGENCY MEDICINE

## 2019-04-16 PROCEDURE — 80053 COMPREHEN METABOLIC PANEL: CPT | Performed by: EMERGENCY MEDICINE

## 2019-04-16 PROCEDURE — 87040 BLOOD CULTURE FOR BACTERIA: CPT | Performed by: EMERGENCY MEDICINE

## 2019-04-16 PROCEDURE — 87186 SC STD MICRODIL/AGAR DIL: CPT | Performed by: EMERGENCY MEDICINE

## 2019-04-16 PROCEDURE — 87184 SC STD DISK METHOD PER PLATE: CPT | Performed by: EMERGENCY MEDICINE

## 2019-04-16 PROCEDURE — 81001 URINALYSIS AUTO W/SCOPE: CPT | Performed by: EMERGENCY MEDICINE

## 2019-04-16 PROCEDURE — 96365 THER/PROPH/DIAG IV INF INIT: CPT

## 2019-04-16 PROCEDURE — 83605 ASSAY OF LACTIC ACID: CPT | Performed by: EMERGENCY MEDICINE

## 2019-04-16 RX ORDER — SULFAMETHOXAZOLE AND TRIMETHOPRIM 800; 160 MG/1; MG/1
1 TABLET ORAL 2 TIMES DAILY
Qty: 14 TABLET | Refills: 0 | Status: SHIPPED | OUTPATIENT
Start: 2019-04-16 | End: 2019-04-23

## 2019-04-16 RX ORDER — OMEPRAZOLE 20 MG/1
20 CAPSULE, DELAYED RELEASE ORAL
COMMUNITY
End: 2020-09-22

## 2019-04-16 NOTE — ED PROVIDER NOTES
Patient Seen in: [facility] Emergency Department    History   Patient presents with:  Urinary Symptoms (urologic)    Stated Complaint: URINARY SYMPTOMS    HPI    Patient is a pleasant 15-year-old female with a history of recurrent UTIs who presents meningismus. No adenopathy  Lungs: No tachypnea. Lungs clear to auscultation bilaterally without rales/rhonchi. Equal breath sounds bilaterally  Cardiac: No tachycardia. No murmurs. Regular rate and rhythm. Abdomen: Soft and nontender throughout.   No Status                     ---------                               -----------         ------                     CBC W/ DIFFERENTIAL[[id number]]          Abnormal            Final result                 Please view results for these tests on the [place]

## 2019-04-16 NOTE — ED NOTES
Spoke with Aria Jobs at [facility] who states they do not have transport at this time and pt can decide if she wants to take a taxi or medicar. Pt states she will take a taxi \"I have enough money. \"

## 2019-04-16 NOTE — ED NOTES
Pt ambulatory to bathroom with assist x3. Pt has steady gait however feels like she needs to hold on to something to walk. Bedside commode placed in pt room for convenience. Pt on bedside commode at this time.

## 2019-05-13 ENCOUNTER — APPOINTMENT (OUTPATIENT)
Dept: CARDIOLOGY | Age: 84
End: 2019-05-13
Attending: INTERNAL MEDICINE

## 2019-05-14 PROCEDURE — 93296 REM INTERROG EVL PM/IDS: CPT | Performed by: INTERNAL MEDICINE

## 2019-05-14 PROCEDURE — 93294 REM INTERROG EVL PM/LDLS PM: CPT | Performed by: INTERNAL MEDICINE

## 2019-06-03 ENCOUNTER — APPOINTMENT (OUTPATIENT)
Dept: CARDIOLOGY | Age: 84
End: 2019-06-03
Attending: INTERNAL MEDICINE

## 2019-06-10 ENCOUNTER — APPOINTMENT (OUTPATIENT)
Dept: CARDIOLOGY | Age: 84
End: 2019-06-10
Attending: INTERNAL MEDICINE

## 2019-06-12 ENCOUNTER — ANCILLARY ORDERS (OUTPATIENT)
Dept: CARDIOLOGY | Age: 84
End: 2019-06-12

## 2019-06-12 ENCOUNTER — ANCILLARY PROCEDURE (OUTPATIENT)
Dept: CARDIOLOGY | Age: 84
End: 2019-06-12
Attending: INTERNAL MEDICINE

## 2019-06-12 DIAGNOSIS — I48.91 ATRIAL FIBRILLATION, UNSPECIFIED TYPE (CMD): ICD-10-CM

## 2019-07-08 ENCOUNTER — TELEPHONE (OUTPATIENT)
Dept: CARDIOLOGY | Age: 84
End: 2019-07-08

## 2019-07-15 RX ORDER — APIXABAN 2.5 MG/1
TABLET, FILM COATED ORAL
Qty: 60 TABLET | Refills: 0 | Status: SHIPPED | OUTPATIENT
Start: 2019-07-15 | End: 2019-08-13 | Stop reason: SDUPTHER

## 2019-08-13 RX ORDER — APIXABAN 2.5 MG/1
TABLET, FILM COATED ORAL
Qty: 60 TABLET | Refills: 0 | Status: SHIPPED | OUTPATIENT
Start: 2019-08-13 | End: 2019-09-16 | Stop reason: SDUPTHER

## 2019-08-15 RX ORDER — TIMOLOL MALEATE 5 MG/ML
1 SOLUTION/ DROPS OPHTHALMIC
COMMUNITY
Start: 2016-04-18

## 2019-08-15 RX ORDER — MEMANTINE HYDROCHLORIDE 10 MG/1
10 TABLET ORAL
COMMUNITY

## 2019-08-15 RX ORDER — LEVOTHYROXINE SODIUM 0.03 MG/1
25 TABLET ORAL
COMMUNITY
Start: 2018-05-07

## 2019-08-15 RX ORDER — TRIAMCINOLONE ACETONIDE 1 MG/G
0.1 CREAM TOPICAL
COMMUNITY
Start: 2018-05-16

## 2019-08-15 RX ORDER — OMEPRAZOLE 20 MG/1
20 CAPSULE, DELAYED RELEASE ORAL
COMMUNITY
End: 2020-01-01

## 2019-08-19 PROCEDURE — 93296 REM INTERROG EVL PM/IDS: CPT | Performed by: INTERNAL MEDICINE

## 2019-09-16 ENCOUNTER — TELEPHONE (OUTPATIENT)
Dept: CARDIOLOGY | Age: 84
End: 2019-09-16

## 2019-10-08 RX ORDER — APIXABAN 2.5 MG/1
TABLET, FILM COATED ORAL
Qty: 60 TABLET | Refills: 0 | Status: SHIPPED | OUTPATIENT
Start: 2019-10-08 | End: 2020-01-01 | Stop reason: ALTCHOICE

## 2019-10-09 ENCOUNTER — HOSPITAL ENCOUNTER (EMERGENCY)
Facility: HOSPITAL | Age: 84
Discharge: HOME | End: 2019-10-09
Attending: EMERGENCY MEDICINE
Payer: MEDICARE

## 2019-10-09 VITALS
DIASTOLIC BLOOD PRESSURE: 59 MMHG | TEMPERATURE: 98 F | HEIGHT: 66 IN | WEIGHT: 130 LBS | SYSTOLIC BLOOD PRESSURE: 121 MMHG | HEART RATE: 60 BPM | BODY MASS INDEX: 20.89 KG/M2 | RESPIRATION RATE: 18 BRPM | OXYGEN SATURATION: 95 %

## 2019-10-09 DIAGNOSIS — N30.00 ACUTE CYSTITIS WITHOUT HEMATURIA: Primary | ICD-10-CM

## 2019-10-09 PROCEDURE — 99283 EMERGENCY DEPT VISIT LOW MDM: CPT

## 2019-10-09 PROCEDURE — 87086 URINE CULTURE/COLONY COUNT: CPT | Performed by: PHYSICIAN ASSISTANT

## 2019-10-09 PROCEDURE — 81001 URINALYSIS AUTO W/SCOPE: CPT | Performed by: PHYSICIAN ASSISTANT

## 2019-10-09 PROCEDURE — 87077 CULTURE AEROBIC IDENTIFY: CPT | Performed by: PHYSICIAN ASSISTANT

## 2019-10-09 PROCEDURE — 99284 EMERGENCY DEPT VISIT MOD MDM: CPT

## 2019-10-09 RX ORDER — NITROFURANTOIN 25; 75 MG/1; MG/1
100 CAPSULE ORAL 2 TIMES DAILY
Qty: 14 CAPSULE | Refills: 0 | Status: SHIPPED | OUTPATIENT
Start: 2019-10-09 | End: 2019-10-16

## 2019-10-09 NOTE — ED NOTES
Pt aox4. Rn discussed dc, macrobid bid and follow up with pcp and return with worsening s/s with patient. Pt verbalized understanding of dc instructions. No family at bedside. Pt states \"[place] will pick me up. \" Rn called [address] and s

## 2019-10-09 NOTE — ED INITIAL ASSESSMENT (HPI)
Patient is from [facility], reports she does not know why she was sent here but that she does think she has a UTI. Patient reports discomfort and frequency.

## 2019-10-09 NOTE — ED NOTES
Spoke with RN at [address] [place] [address], aware that patient is being discharged. RN to Rn report given. Per patient she does not know if her son can pick her up and take her back to [facility].

## 2019-10-09 NOTE — ED PROVIDER NOTES
Patient Seen in: [facility] Emergency Department      History   Patient presents with:  Urinary Symptoms (urologic)    Stated Complaint: UTI     HPI    CHIEF COMPLAINT: UTI symptoms    HISTORY OF PRESENT ILLNESS: Patient is a pleasant 72-year-old fe Review of Systems    Positive for stated complaint: UTI   Other systems are as noted in HPI. Constitutional and vital signs reviewed. All other systems reviewed and negative except as noted above.     Physical Exam     ED Triage Vitals [10/09/19 revealed leukocyte esterase, WBCs, blood, 3+ bacteria. MDM     3007: Patient was placed on Macrobid for her urinary tract infection as this is sensitive to her past urinary tract infection.   Patient does not look septic, patient abdomen soft, benign wit

## 2019-10-28 RX ORDER — CALCIUM ACETATE 667 MG/1
CAPSULE ORAL
Refills: 1 | COMMUNITY
Start: 2019-10-11 | End: 2020-01-01

## 2019-10-28 RX ORDER — LIOTHYRONINE SODIUM 25 UG/1
TABLET ORAL
COMMUNITY
Start: 2019-10-23

## 2019-10-28 RX ORDER — AMLODIPINE BESYLATE 5 MG/1
5 TABLET ORAL DAILY
Refills: 4 | COMMUNITY
Start: 2019-10-04

## 2019-11-03 ENCOUNTER — HOSPITAL ENCOUNTER (EMERGENCY)
Facility: HOSPITAL | Age: 84
Discharge: HOME | End: 2019-11-03
Attending: EMERGENCY MEDICINE
Payer: MEDICARE

## 2019-11-03 ENCOUNTER — APPOINTMENT (OUTPATIENT)
Dept: GENERAL RADIOLOGY | Facility: HOSPITAL | Age: 84
End: 2019-11-03
Attending: EMERGENCY MEDICINE
Payer: MEDICARE

## 2019-11-03 VITALS
HEART RATE: 60 BPM | DIASTOLIC BLOOD PRESSURE: 51 MMHG | OXYGEN SATURATION: 95 % | BODY MASS INDEX: 21 KG/M2 | SYSTOLIC BLOOD PRESSURE: 138 MMHG | TEMPERATURE: 99 F | RESPIRATION RATE: 17 BRPM | WEIGHT: 130.06 LBS

## 2019-11-03 DIAGNOSIS — R41.0 CONFUSION: Primary | ICD-10-CM

## 2019-11-03 PROCEDURE — 81001 URINALYSIS AUTO W/SCOPE: CPT | Performed by: EMERGENCY MEDICINE

## 2019-11-03 PROCEDURE — 93010 ELECTROCARDIOGRAM REPORT: CPT

## 2019-11-03 PROCEDURE — 99285 EMERGENCY DEPT VISIT HI MDM: CPT

## 2019-11-03 PROCEDURE — 87086 URINE CULTURE/COLONY COUNT: CPT | Performed by: EMERGENCY MEDICINE

## 2019-11-03 PROCEDURE — 80053 COMPREHEN METABOLIC PANEL: CPT | Performed by: EMERGENCY MEDICINE

## 2019-11-03 PROCEDURE — 73090 X-RAY EXAM OF FOREARM: CPT | Performed by: EMERGENCY MEDICINE

## 2019-11-03 PROCEDURE — 85025 COMPLETE CBC W/AUTO DIFF WBC: CPT | Performed by: EMERGENCY MEDICINE

## 2019-11-03 PROCEDURE — 36415 COLL VENOUS BLD VENIPUNCTURE: CPT

## 2019-11-03 PROCEDURE — 93005 ELECTROCARDIOGRAM TRACING: CPT

## 2019-11-03 NOTE — ED NOTES
Pt c/o right forearm pain and swelling. MD at bedside. Per Ninette Leyden, RN, pt had swelling to that arm on arrival. Ace wrap applied per MD order. + radial pulse.

## 2019-11-03 NOTE — ED PROVIDER NOTES
Patient Seen in: [facility] Emergency Department      History   Patient presents with:  Altered Mental Status (neurologic)    Stated Complaint: AMS    HPI    70-year-old female presents emergency department who apparently had gone down to breakfast Physical Exam    Vital signs reviewed  General appearance: Patient is alert and in no acute distress  HEENT: Pupils equal react to light extraocular muscles intact no scleral icterus, mucous membranes are moist, there is no erythema or exudate in t Final result                 Please view results for these tests on the individual orders.    RAINBOW DRAW BLUE   RAINBOW DRAW LAVENDER   RAINBOW DRAW LIGHT GREEN   RAINBOW DRAW GOLD   URINE CULTURE, ROUTINE     EKG    Rate, intervals and axes as noted o Impression:  Confusion  (primary encounter diagnosis)    Disposition:  Discharge  11/3/2019  2:10 pm    Follow-up:  Bailey Barrow, [address]  Anthony Gallegos [phone]              Medications Prescribed:  Discharge Medic

## 2019-11-03 NOTE — ED INITIAL ASSESSMENT (HPI)
PT TO ED WITH AMS. PT STATES SHE WENT \"DOWN TO BREAKFAST AND HAD A BLANK SPACE. \" \"I MUST HAVE DONE SOMETHING WEIRD. \" PT AAOX2. PT HAS HX OF DEMENTIA.

## 2019-11-03 NOTE — ED NOTES
Phlebotomy paged for blood draw. [place] Marker able to obtain an IV in right hand, flushes well, but unable to draw blood. Pt has been stuck 4 times. Warm blankets given.

## 2019-11-03 NOTE — ED NOTES
NIH done by Gloria Curtis RN and score is 0.  Per Gloria Curtis, patient also passed her swallow eval.

## 2019-11-25 PROCEDURE — 93296 REM INTERROG EVL PM/IDS: CPT | Performed by: INTERNAL MEDICINE

## 2019-11-25 PROCEDURE — 93294 REM INTERROG EVL PM/LDLS PM: CPT | Performed by: INTERNAL MEDICINE

## 2019-11-26 ENCOUNTER — TELEPHONE (OUTPATIENT)
Dept: CARDIOLOGY | Age: 84
End: 2019-11-26

## 2019-11-26 ENCOUNTER — ANCILLARY ORDERS (OUTPATIENT)
Dept: CARDIOLOGY | Age: 84
End: 2019-11-26

## 2019-11-26 ENCOUNTER — ANCILLARY PROCEDURE (OUTPATIENT)
Dept: CARDIOLOGY | Age: 84
End: 2019-11-26
Attending: INTERNAL MEDICINE

## 2019-11-26 DIAGNOSIS — Z95.0 CARDIAC PACEMAKER IN SITU: ICD-10-CM

## 2019-11-26 PROCEDURE — X1114 CARDIAC DEVICE HOME CHECK - REMOTE UNSCHEDULED: HCPCS | Performed by: INTERNAL MEDICINE

## 2020-01-01 ENCOUNTER — TELEPHONE (OUTPATIENT)
Dept: CARDIOLOGY | Age: 85
End: 2020-01-01

## 2020-01-01 ENCOUNTER — ANCILLARY PROCEDURE (OUTPATIENT)
Dept: CARDIOLOGY | Age: 85
End: 2020-01-01
Attending: INTERNAL MEDICINE

## 2020-01-01 ENCOUNTER — ANCILLARY ORDERS (OUTPATIENT)
Dept: CARDIOLOGY | Age: 85
End: 2020-01-01

## 2020-01-01 ENCOUNTER — EXTERNAL RECORD (OUTPATIENT)
Dept: OTHER | Age: 85
End: 2020-01-01

## 2020-01-01 ENCOUNTER — OFFICE VISIT (OUTPATIENT)
Dept: CARDIOLOGY | Age: 85
End: 2020-01-01

## 2020-01-01 VITALS — HEART RATE: 100 BPM | DIASTOLIC BLOOD PRESSURE: 66 MMHG | SYSTOLIC BLOOD PRESSURE: 100 MMHG

## 2020-01-01 VITALS
HEIGHT: 66 IN | HEART RATE: 114 BPM | WEIGHT: 128 LBS | BODY MASS INDEX: 20.57 KG/M2 | SYSTOLIC BLOOD PRESSURE: 92 MMHG | DIASTOLIC BLOOD PRESSURE: 70 MMHG

## 2020-01-01 DIAGNOSIS — Z01.812 PRE-PROCEDURE LAB EXAM: ICD-10-CM

## 2020-01-01 DIAGNOSIS — Z01.818 PREOP EXAMINATION: ICD-10-CM

## 2020-01-01 DIAGNOSIS — Z45.018 ENCOUNTER FOR CARE OF PACEMAKER: ICD-10-CM

## 2020-01-01 DIAGNOSIS — Z95.0 CARDIAC PACEMAKER: ICD-10-CM

## 2020-01-01 DIAGNOSIS — Z45.010 PACEMAKER AT END OF BATTERY LIFE: ICD-10-CM

## 2020-01-01 DIAGNOSIS — Z95.0 PRESENCE OF CARDIAC PACEMAKER: Primary | ICD-10-CM

## 2020-01-01 DIAGNOSIS — Z95.0 CARDIAC PACEMAKER IN SITU: ICD-10-CM

## 2020-01-01 LAB
SARS-COV-2 RNA SPEC QL NAA+PROBE: NOT DETECTED
SPECIMEN SOURCE: NORMAL

## 2020-01-01 PROCEDURE — 93296 REM INTERROG EVL PM/IDS: CPT | Performed by: INTERNAL MEDICINE

## 2020-01-01 PROCEDURE — 93280 PM DEVICE PROGR EVAL DUAL: CPT | Performed by: INTERNAL MEDICINE

## 2020-01-01 PROCEDURE — X1114 CARDIAC DEVICE HOME CHECK - REMOTE UNSCHEDULED: HCPCS | Performed by: INTERNAL MEDICINE

## 2020-01-01 PROCEDURE — 93294 REM INTERROG EVL PM/LDLS PM: CPT | Performed by: INTERNAL MEDICINE

## 2020-01-01 PROCEDURE — 99204 OFFICE O/P NEW MOD 45 MIN: CPT | Performed by: INTERNAL MEDICINE

## 2020-01-01 PROCEDURE — 33228 REMV&REPLC PM GEN DUAL LEAD: CPT | Performed by: INTERNAL MEDICINE

## 2020-01-01 PROCEDURE — 99152 MOD SED SAME PHYS/QHP 5/>YRS: CPT | Performed by: INTERNAL MEDICINE

## 2020-01-01 RX ORDER — LACTOBACILLUS RHAMNOSUS GG 10B CELL
CAPSULE ORAL
COMMUNITY

## 2020-01-01 RX ORDER — TORSEMIDE 20 MG/1
20 TABLET ORAL DAILY
COMMUNITY
Start: 2020-01-01

## 2020-01-01 RX ORDER — MODAFINIL 200 MG/1
TABLET ORAL
COMMUNITY
Start: 2020-01-01

## 2020-01-01 RX ORDER — OMEGA-3-ACID ETHYL ESTERS 1 G/1
CAPSULE, LIQUID FILLED ORAL
COMMUNITY
Start: 2020-01-01

## 2020-01-01 RX ORDER — MECLIZINE HYDROCHLORIDE 25 MG/1
TABLET ORAL
COMMUNITY
Start: 2020-01-01

## 2020-01-01 ASSESSMENT — PATIENT HEALTH QUESTIONNAIRE - PHQ9
2. FEELING DOWN, DEPRESSED OR HOPELESS: NOT AT ALL
SUM OF ALL RESPONSES TO PHQ9 QUESTIONS 1 AND 2: 0
1. LITTLE INTEREST OR PLEASURE IN DOING THINGS: NOT AT ALL
CLINICAL INTERPRETATION OF PHQ2 SCORE: NO FURTHER SCREENING NEEDED
SUM OF ALL RESPONSES TO PHQ9 QUESTIONS 1 AND 2: 0
CLINICAL INTERPRETATION OF PHQ9 SCORE: NO FURTHER SCREENING NEEDED

## 2020-02-10 PROCEDURE — 87086 URINE CULTURE/COLONY COUNT: CPT | Performed by: EMERGENCY MEDICINE

## 2020-02-10 PROCEDURE — 81001 URINALYSIS AUTO W/SCOPE: CPT | Performed by: EMERGENCY MEDICINE

## 2020-02-11 ENCOUNTER — LAB REQUISITION (OUTPATIENT)
Dept: LAB | Facility: HOSPITAL | Age: 85
End: 2020-02-11
Payer: MEDICARE

## 2020-02-11 DIAGNOSIS — N39.0 URINARY TRACT INFECTION, SITE NOT SPECIFIED: ICD-10-CM

## 2020-02-11 LAB
BILIRUB UR QL STRIP.AUTO: NEGATIVE
COLOR UR AUTO: YELLOW
GLUCOSE UR STRIP.AUTO-MCNC: NEGATIVE MG/DL
KETONES UR STRIP.AUTO-MCNC: NEGATIVE MG/DL
NITRITE UR QL STRIP.AUTO: NEGATIVE
PH UR STRIP.AUTO: 6 [PH] (ref 4.5–8)
PROT UR STRIP.AUTO-MCNC: 30 MG/DL
RBC #/AREA URNS AUTO: >10 /HPF
SP GR UR STRIP.AUTO: 1.01 (ref 1–1.03)
UROBILINOGEN UR STRIP.AUTO-MCNC: <2 MG/DL
WBC #/AREA URNS AUTO: >50 /HPF

## 2020-03-02 ENCOUNTER — ANCILLARY PROCEDURE (OUTPATIENT)
Dept: CARDIOLOGY | Age: 85
End: 2020-03-02
Attending: INTERNAL MEDICINE

## 2020-03-02 ENCOUNTER — ANCILLARY ORDERS (OUTPATIENT)
Dept: CARDIOLOGY | Age: 85
End: 2020-03-02

## 2020-03-02 DIAGNOSIS — Z95.0 CARDIAC PACEMAKER: ICD-10-CM

## 2020-03-02 PROCEDURE — 93296 REM INTERROG EVL PM/IDS: CPT | Performed by: INTERNAL MEDICINE

## 2020-03-02 PROCEDURE — X1114 CARDIAC DEVICE HOME CHECK - REMOTE UNSCHEDULED: HCPCS | Performed by: INTERNAL MEDICINE

## 2020-03-02 PROCEDURE — 93294 REM INTERROG EVL PM/LDLS PM: CPT | Performed by: INTERNAL MEDICINE

## 2020-05-27 ENCOUNTER — HOSPITAL ENCOUNTER (EMERGENCY)
Facility: HOSPITAL | Age: 85
Discharge: HOME | End: 2020-05-27
Attending: EMERGENCY MEDICINE
Payer: MEDICARE

## 2020-05-27 VITALS
HEART RATE: 92 BPM | OXYGEN SATURATION: 98 % | WEIGHT: 120 LBS | BODY MASS INDEX: 19.29 KG/M2 | RESPIRATION RATE: 17 BRPM | HEIGHT: 66 IN | TEMPERATURE: 99 F | SYSTOLIC BLOOD PRESSURE: 117 MMHG | DIASTOLIC BLOOD PRESSURE: 68 MMHG

## 2020-05-27 DIAGNOSIS — R52 BODY ACHES: Primary | ICD-10-CM

## 2020-05-27 PROCEDURE — 80053 COMPREHEN METABOLIC PANEL: CPT | Performed by: EMERGENCY MEDICINE

## 2020-05-27 PROCEDURE — 99284 EMERGENCY DEPT VISIT MOD MDM: CPT

## 2020-05-27 PROCEDURE — 93005 ELECTROCARDIOGRAM TRACING: CPT

## 2020-05-27 PROCEDURE — 99283 EMERGENCY DEPT VISIT LOW MDM: CPT

## 2020-05-27 PROCEDURE — 36415 COLL VENOUS BLD VENIPUNCTURE: CPT

## 2020-05-27 PROCEDURE — 85025 COMPLETE CBC W/AUTO DIFF WBC: CPT | Performed by: EMERGENCY MEDICINE

## 2020-05-27 PROCEDURE — 93010 ELECTROCARDIOGRAM REPORT: CPT

## 2020-05-27 NOTE — ED PROVIDER NOTES
Patient Seen in: [facility] Emergency Department      History   Patient presents with: Other    Stated Complaint: body aches    HPI    Patient is a 80-year-old female history of dementia comes emergency room for evaluation of muscle aches.   Halley 05/27/20 0612 Temporal   SpO2 05/27/20 0612 96 %   O2 Device 05/27/20 3892 None (Room air)       Current:/68   Pulse 92   Temp 98.5 °F (36.9 °C) (Temporal)   Resp 17   Ht 167.6 cm (5' 6\")   Wt 54.4 kg   SpO2 98%   BMI 19.37 kg/m²         Physical Ex Abnormality         Status                     ---------                               -----------         ------                     CBC W/ DIFFERENTIAL[[id number]]          Abnormal            Final result                 Please view results for these brenda Prescribed:  Current Discharge Medication List

## 2020-05-27 NOTE — ED INITIAL ASSESSMENT (HPI)
Pt here via EMS, EMS states that patients call was for body aches. Pt states \"someone at [facility] told me I passed out\"  Pt denies body aches.

## 2020-09-16 PROBLEM — Z95.0 PRESENCE OF CARDIAC PACEMAKER: Status: ACTIVE | Noted: 2020-01-01

## 2020-09-21 ENCOUNTER — APPOINTMENT (OUTPATIENT)
Dept: LAB | Facility: HOSPITAL | Age: 85
End: 2020-09-21
Attending: INTERNAL MEDICINE
Payer: MEDICARE

## 2020-09-21 DIAGNOSIS — Z45.010 PACEMAKER AT END OF BATTERY LIFE: ICD-10-CM

## 2020-09-22 ENCOUNTER — NURSE ONLY (OUTPATIENT)
Dept: LAB | Age: 85
End: 2020-09-22
Attending: INTERNAL MEDICINE
Payer: MEDICARE

## 2020-09-22 DIAGNOSIS — Z45.010 PACEMAKER AT END OF BATTERY LIFE: ICD-10-CM

## 2020-09-22 DIAGNOSIS — Z95.0 PRESENCE OF CARDIAC PACEMAKER: Primary | ICD-10-CM

## 2020-09-22 DIAGNOSIS — Z01.818 PREOP EXAMINATION: ICD-10-CM

## 2020-09-22 LAB
ANION GAP SERPL CALC-SCNC: 9 MMOL/L (ref 0–18)
BASOPHILS # BLD AUTO: 0.08 X10(3) UL (ref 0–0.2)
BASOPHILS NFR BLD AUTO: 1.2 %
BUN BLD-MCNC: 57 MG/DL (ref 7–18)
BUN/CREAT SERPL: 16.7 (ref 10–20)
CALCIUM BLD-MCNC: 9.5 MG/DL (ref 8.5–10.1)
CHLORIDE SERPL-SCNC: 105 MMOL/L (ref 98–112)
CO2 SERPL-SCNC: 24 MMOL/L (ref 21–32)
CREAT BLD-MCNC: 3.42 MG/DL
DEPRECATED RDW RBC AUTO: 56.9 FL (ref 35.1–46.3)
EOSINOPHIL # BLD AUTO: 0.18 X10(3) UL (ref 0–0.7)
EOSINOPHIL NFR BLD AUTO: 2.7 %
ERYTHROCYTE [DISTWIDTH] IN BLOOD BY AUTOMATED COUNT: 16.2 % (ref 11–15)
GLUCOSE BLD-MCNC: 104 MG/DL (ref 70–99)
HCT VFR BLD AUTO: 44.6 %
HGB BLD-MCNC: 13.5 G/DL
IMM GRANULOCYTES # BLD AUTO: 0.02 X10(3) UL (ref 0–1)
IMM GRANULOCYTES NFR BLD: 0.3 %
LYMPHOCYTES # BLD AUTO: 1.08 X10(3) UL (ref 1–4)
LYMPHOCYTES NFR BLD AUTO: 16.5 %
MCH RBC QN AUTO: 29 PG (ref 26–34)
MCHC RBC AUTO-ENTMCNC: 30.3 G/DL (ref 31–37)
MCV RBC AUTO: 95.9 FL
MONOCYTES # BLD AUTO: 0.72 X10(3) UL (ref 0.1–1)
MONOCYTES NFR BLD AUTO: 11 %
NEUTROPHILS # BLD AUTO: 4.47 X10 (3) UL (ref 1.5–7.7)
NEUTROPHILS # BLD AUTO: 4.47 X10(3) UL (ref 1.5–7.7)
NEUTROPHILS NFR BLD AUTO: 68.3 %
OSMOLALITY SERPL CALC.SUM OF ELEC: 302 MOSM/KG (ref 275–295)
PATIENT FASTING Y/N/NP: NO
PLATELET # BLD AUTO: 167 10(3)UL (ref 150–450)
POTASSIUM SERPL-SCNC: 4.1 MMOL/L (ref 3.5–5.1)
RBC # BLD AUTO: 4.65 X10(6)UL
SODIUM SERPL-SCNC: 138 MMOL/L (ref 136–145)
WBC # BLD AUTO: 6.6 X10(3) UL (ref 4–11)

## 2020-09-22 PROCEDURE — 36415 COLL VENOUS BLD VENIPUNCTURE: CPT

## 2020-09-22 PROCEDURE — 80048 BASIC METABOLIC PNL TOTAL CA: CPT

## 2020-09-22 PROCEDURE — 85025 COMPLETE CBC W/AUTO DIFF WBC: CPT

## 2020-09-22 RX ORDER — TORSEMIDE 20 MG/1
20 TABLET ORAL DAILY
COMMUNITY

## 2020-09-22 RX ORDER — MECLIZINE HYDROCHLORIDE 25 MG/1
25 TABLET ORAL 2 TIMES DAILY
COMMUNITY

## 2020-09-22 RX ORDER — ASPIRIN 81 MG/1
81 TABLET, CHEWABLE ORAL DAILY
COMMUNITY

## 2020-09-22 RX ORDER — MODAFINIL 200 MG/1
300 TABLET ORAL DAILY
COMMUNITY

## 2020-09-22 NOTE — H&P
Progress Notes  - documented in this encounter  Grace Beck MD - 2020 3:45 PM CDT  Formatting of this note might be different from the original.  [address] Note    Anca Rios  : 10/13/1927  PCP: Sol Billingsley, lightheaded. Medications:  Current Outpatient Medications   Medication Sig Dispense Refill   • meclizine (ANTIVERT) 25 MG tablet TK 1 T PO BID OR UTD   • torsemide (DEMADEX) 20 MG tablet Take 20 mg by mouth daily.    • modafinil (PROVIGIL) 200 MG tablet Component Date Value Ref Range Status   • B TYPE NATRIURETIC PEPTIDE 05/07/2018 615.0* 0.0 - 100.0 pg/mL Final   ]    Data:    Diagnosis:  There is no problem list on file for this patient.     Lilia Duron MD    Electronically signed by Lilia Duron

## 2020-09-23 LAB — SARS-COV-2 RNA RESP QL NAA+PROBE: NOT DETECTED

## 2020-09-23 RX ORDER — LIOTHYRONINE SODIUM 25 UG/1
25 TABLET ORAL DAILY
COMMUNITY

## 2020-09-23 NOTE — PAT NURSING NOTE
PAT note:    Patient scheduled for PPM generator change on 9/24; spoke with son Connie Bedolla for PAT instructions/med list update; Patient's son stated that his current med list showed patient taking eliquis 2.5 mg bid; Confirmed with patient's medication service

## 2020-09-24 ENCOUNTER — HOSPITAL ENCOUNTER (OUTPATIENT)
Dept: INTERVENTIONAL RADIOLOGY/VASCULAR | Facility: HOSPITAL | Age: 85
Discharge: NURSING, REHAB OR LONG-TERM CARE | End: 2020-09-24
Attending: INTERNAL MEDICINE | Admitting: INTERNAL MEDICINE
Payer: MEDICARE

## 2020-09-24 VITALS
OXYGEN SATURATION: 98 % | TEMPERATURE: 98 F | RESPIRATION RATE: 16 BRPM | BODY MASS INDEX: 21.85 KG/M2 | WEIGHT: 128 LBS | HEIGHT: 64 IN | DIASTOLIC BLOOD PRESSURE: 73 MMHG | SYSTOLIC BLOOD PRESSURE: 111 MMHG | HEART RATE: 102 BPM

## 2020-09-24 DIAGNOSIS — Z45.010 PACEMAKER AT END OF BATTERY LIFE: Primary | ICD-10-CM

## 2020-09-24 PROCEDURE — 0JH606Z INSERTION OF PACEMAKER, DUAL CHAMBER INTO CHEST SUBCUTANEOUS TISSUE AND FASCIA, OPEN APPROACH: ICD-10-PCS | Performed by: INTERNAL MEDICINE

## 2020-09-24 PROCEDURE — 99152 MOD SED SAME PHYS/QHP 5/>YRS: CPT

## 2020-09-24 PROCEDURE — 99153 MOD SED SAME PHYS/QHP EA: CPT

## 2020-09-24 PROCEDURE — 33208 INSRT HEART PM ATRIAL & VENT: CPT

## 2020-09-24 PROCEDURE — 0JPT0PZ REMOVAL OF CARDIAC RHYTHM RELATED DEVICE FROM TRUNK SUBCUTANEOUS TISSUE AND FASCIA, OPEN APPROACH: ICD-10-PCS | Performed by: INTERNAL MEDICINE

## 2020-09-24 PROCEDURE — 33228 REMV&REPLC PM GEN DUAL LEAD: CPT

## 2020-09-24 RX ORDER — SODIUM CHLORIDE 9 MG/ML
INJECTION, SOLUTION INTRAVENOUS
Status: COMPLETED | OUTPATIENT
Start: 2020-09-25 | End: 2020-09-24

## 2020-09-24 RX ORDER — CEFAZOLIN SODIUM/WATER 2 G/20 ML
SYRINGE (ML) INTRAVENOUS
Status: COMPLETED
Start: 2020-09-24 | End: 2020-09-24

## 2020-09-24 RX ORDER — LIDOCAINE HYDROCHLORIDE 10 MG/ML
INJECTION, SOLUTION EPIDURAL; INFILTRATION; INTRACAUDAL; PERINEURAL
Status: COMPLETED
Start: 2020-09-24 | End: 2020-09-24

## 2020-09-24 RX ORDER — MIDAZOLAM HYDROCHLORIDE 1 MG/ML
INJECTION INTRAMUSCULAR; INTRAVENOUS
Status: COMPLETED
Start: 2020-09-24 | End: 2020-09-24

## 2020-09-24 RX ORDER — BACITRACIN 50000 [USP'U]/1
INJECTION, POWDER, LYOPHILIZED, FOR SOLUTION INTRAMUSCULAR
Status: COMPLETED
Start: 2020-09-24 | End: 2020-09-24

## 2020-09-24 RX ORDER — CHLORHEXIDINE GLUCONATE 4 G/100ML
30 SOLUTION TOPICAL
Status: COMPLETED | OUTPATIENT
Start: 2020-09-25 | End: 2020-09-24

## 2020-09-24 RX ADMIN — CHLORHEXIDINE GLUCONATE 30 ML: 4 SOLUTION TOPICAL at 10:24:00

## 2020-09-24 RX ADMIN — SODIUM CHLORIDE 20 ML/HR: 9 INJECTION, SOLUTION INTRAVENOUS at 11:01:00

## 2020-09-24 NOTE — PROCEDURES
OPERATION(S) PERFORMED:   1. Dual Chamber ppm implant. 2. pacemaker generator removal.     : Elysia Mckeon MD  INDICATION: Device at SUAD  COMPLICATIONS: None     ESTIMATED BLOOD LOSS: Minimal.  SEDATION: IV was maintained by RN.  Patient was assessed

## 2020-09-24 NOTE — H&P
[facility]  H&P    Chestine Monterey Patient Status:  Outpatient in a Bed    10/13/1927 MRN [id number]   Location [address] [facility] Attending Sadie Hall MD   Hosp Day # 0 PCP Chuy Haywood MD reports that she has never smoked. She has never used smokeless tobacco. She reports that she does not drink alcohol or use drugs.     Allergies:    Brimonidine             UNKNOWN  Warfarin                UNKNOWN    Medications:  No current facility-a

## 2021-01-01 ENCOUNTER — ANCILLARY ORDERS (OUTPATIENT)
Dept: CARDIOLOGY | Age: 86
End: 2021-01-01

## 2021-01-01 ENCOUNTER — ANCILLARY PROCEDURE (OUTPATIENT)
Dept: CARDIOLOGY | Age: 86
End: 2021-01-01
Attending: INTERNAL MEDICINE

## 2021-01-01 ENCOUNTER — TELEPHONE (OUTPATIENT)
Dept: CARDIOLOGY | Age: 86
End: 2021-01-01

## 2021-01-01 DIAGNOSIS — Z45.018 ENCOUNTER FOR CARE OF PACEMAKER: ICD-10-CM

## 2021-01-01 DIAGNOSIS — Z95.0 CARDIAC PACEMAKER: ICD-10-CM

## 2021-01-01 PROCEDURE — 99214 OFFICE O/P EST MOD 30 MIN: CPT | Performed by: INTERNAL MEDICINE

## 2021-01-01 PROCEDURE — X1114 CARDIAC DEVICE HOME CHECK - REMOTE UNSCHEDULED: HCPCS | Performed by: INTERNAL MEDICINE

## 2021-01-01 PROCEDURE — 99214 OFFICE O/P EST MOD 30 MIN: CPT | Performed by: CLINICAL NURSE SPECIALIST

## 2021-01-01 PROCEDURE — 99204 OFFICE O/P NEW MOD 45 MIN: CPT | Performed by: INTERNAL MEDICINE

## 2021-01-01 PROCEDURE — 93296 REM INTERROG EVL PM/IDS: CPT | Performed by: INTERNAL MEDICINE

## 2021-01-01 PROCEDURE — 93294 REM INTERROG EVL PM/LDLS PM: CPT | Performed by: INTERNAL MEDICINE

## 2021-01-01 RX ORDER — APIXABAN 2.5 MG/1
2.5 TABLET, FILM COATED ORAL 2 TIMES DAILY
COMMUNITY
Start: 2020-01-01

## 2021-03-08 DIAGNOSIS — Z23 NEED FOR VACCINATION: ICD-10-CM

## 2021-03-11 ENCOUNTER — APPOINTMENT (OUTPATIENT)
Dept: GENERAL RADIOLOGY | Facility: HOSPITAL | Age: 86
End: 2021-03-11
Attending: EMERGENCY MEDICINE
Payer: MEDICARE

## 2021-03-11 ENCOUNTER — HOSPITAL ENCOUNTER (OUTPATIENT)
Facility: HOSPITAL | Age: 86
Setting detail: OBSERVATION
Discharge: HOSPICE | End: 2021-03-16
Attending: EMERGENCY MEDICINE | Admitting: INTERNAL MEDICINE
Payer: MEDICARE

## 2021-03-11 ENCOUNTER — APPOINTMENT (OUTPATIENT)
Dept: CT IMAGING | Facility: HOSPITAL | Age: 86
End: 2021-03-11
Attending: EMERGENCY MEDICINE
Payer: MEDICARE

## 2021-03-11 DIAGNOSIS — E83.52 HYPERCALCEMIA: ICD-10-CM
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
## 2021-03-11 DIAGNOSIS — R41.0 CONFUSION: ICD-10-CM

## 2021-03-11 DIAGNOSIS — N30.01 ACUTE CYSTITIS WITH HEMATURIA: ICD-10-CM

## 2021-03-11 DIAGNOSIS — I21.4 NSTEMI (NON-ST ELEVATED MYOCARDIAL INFARCTION) (HCC): ICD-10-CM

## 2021-03-11 LAB
ALBUMIN SERPL-MCNC: 4.1 G/DL (ref 3.4–5)
ALBUMIN/GLOB SERPL: 1 {RATIO} (ref 1–2)
ALP LIVER SERPL-CCNC: 84 U/L
ALT SERPL-CCNC: 16 U/L
ANION GAP SERPL CALC-SCNC: 13 MMOL/L (ref 0–18)
APTT PPP: 26.1 SECONDS (ref 25.4–36.1)
AST SERPL-CCNC: 22 U/L (ref 15–37)
BASOPHILS # BLD AUTO: 0.05 X10(3) UL (ref 0–0.2)
BASOPHILS NFR BLD AUTO: 0.9 %
BILIRUB SERPL-MCNC: 0.9 MG/DL (ref 0.1–2)
BILIRUB UR QL STRIP.AUTO: NEGATIVE
BUN BLD-MCNC: 73 MG/DL (ref 7–18)
BUN/CREAT SERPL: 25.3 (ref 10–20)
CALCIUM BLD-MCNC: 13.3 MG/DL (ref 8.5–10.1)
CHLORIDE SERPL-SCNC: 92 MMOL/L (ref 98–112)
CLARITY UR REFRACT.AUTO: CLEAR
CO2 SERPL-SCNC: 29 MMOL/L (ref 21–32)
COLOR UR AUTO: YELLOW
CREAT BLD-MCNC: 2.89 MG/DL
DEPRECATED RDW RBC AUTO: 61.4 FL (ref 35.1–46.3)
EOSINOPHIL # BLD AUTO: 0.1 X10(3) UL (ref 0–0.7)
EOSINOPHIL NFR BLD AUTO: 1.9 %
ERYTHROCYTE [DISTWIDTH] IN BLOOD BY AUTOMATED COUNT: 18.6 % (ref 11–15)
GLOBULIN PLAS-MCNC: 4 G/DL (ref 2.8–4.4)
GLUCOSE BLD-MCNC: 78 MG/DL (ref 70–99)
GLUCOSE UR STRIP.AUTO-MCNC: NEGATIVE MG/DL
HCT VFR BLD AUTO: 48.5 %
HGB BLD-MCNC: 15.1 G/DL
HYALINE CASTS #/AREA URNS AUTO: PRESENT /LPF
IMM GRANULOCYTES # BLD AUTO: 0.01 X10(3) UL (ref 0–1)
IMM GRANULOCYTES NFR BLD: 0.2 %
KETONES UR STRIP.AUTO-MCNC: 20 MG/DL
LYMPHOCYTES # BLD AUTO: 0.96 X10(3) UL (ref 1–4)
LYMPHOCYTES NFR BLD AUTO: 17.9 %
M PROTEIN MFR SERPL ELPH: 8.1 G/DL (ref 6.4–8.2)
MCH RBC QN AUTO: 28.9 PG (ref 26–34)
MCHC RBC AUTO-ENTMCNC: 31.1 G/DL (ref 31–37)
MCV RBC AUTO: 92.9 FL
MONOCYTES # BLD AUTO: 0.53 X10(3) UL (ref 0.1–1)
MONOCYTES NFR BLD AUTO: 9.9 %
NEUTROPHILS # BLD AUTO: 3.71 X10 (3) UL (ref 1.5–7.7)
NEUTROPHILS # BLD AUTO: 3.71 X10(3) UL (ref 1.5–7.7)
NEUTROPHILS NFR BLD AUTO: 69.2 %
NITRITE UR QL STRIP.AUTO: NEGATIVE
OSMOLALITY SERPL CALC.SUM OF ELEC: 298 MOSM/KG (ref 275–295)
PH UR STRIP.AUTO: 5 [PH] (ref 5–8)
PLATELET # BLD AUTO: 201 10(3)UL (ref 150–450)
POTASSIUM SERPL-SCNC: 4.5 MMOL/L (ref 3.5–5.1)
PROT UR STRIP.AUTO-MCNC: NEGATIVE MG/DL
RBC # BLD AUTO: 5.22 X10(6)UL
SARS-COV-2 RNA RESP QL NAA+PROBE: NOT DETECTED
SODIUM SERPL-SCNC: 134 MMOL/L (ref 136–145)
SP GR UR STRIP.AUTO: 1.01 (ref 1–1.03)
T3FREE SERPL-MCNC: 4.12 PG/ML (ref 2.4–4.2)
T4 FREE SERPL-MCNC: 0.6 NG/DL (ref 0.8–1.7)
TROPONIN I SERPL-MCNC: 0.06 NG/ML (ref ?–0.04)
TSI SER-ACNC: 0.12 MIU/ML (ref 0.36–3.74)
UROBILINOGEN UR STRIP.AUTO-MCNC: <2 MG/DL
WBC # BLD AUTO: 5.4 X10(3) UL (ref 4–11)

## 2021-03-11 PROCEDURE — 99220 INITIAL OBSERVATION CARE,LEVL III: CPT | Performed by: INTERNAL MEDICINE

## 2021-03-11 PROCEDURE — 72125 CT NECK SPINE W/O DYE: CPT | Performed by: EMERGENCY MEDICINE

## 2021-03-11 PROCEDURE — 71045 X-RAY EXAM CHEST 1 VIEW: CPT | Performed by: EMERGENCY MEDICINE

## 2021-03-11 PROCEDURE — 76377 3D RENDER W/INTRP POSTPROCES: CPT | Performed by: EMERGENCY MEDICINE

## 2021-03-11 PROCEDURE — 70450 CT HEAD/BRAIN W/O DYE: CPT | Performed by: EMERGENCY MEDICINE

## 2021-03-11 PROCEDURE — 70486 CT MAXILLOFACIAL W/O DYE: CPT | Performed by: EMERGENCY MEDICINE

## 2021-03-11 RX ORDER — SODIUM CHLORIDE 9 MG/ML
1000 INJECTION, SOLUTION INTRAVENOUS ONCE
Status: COMPLETED | OUTPATIENT
Start: 2021-03-11 | End: 2021-03-11

## 2021-03-11 RX ORDER — CALCIUM ACETATE 667 MG/1
1 CAPSULE ORAL 2 TIMES DAILY
Status: CANCELLED | OUTPATIENT
Start: 2021-03-12

## 2021-03-11 RX ORDER — HEPARIN SODIUM AND DEXTROSE 10000; 5 [USP'U]/100ML; G/100ML
12 INJECTION INTRAVENOUS ONCE
Status: COMPLETED | OUTPATIENT
Start: 2021-03-11 | End: 2021-03-12

## 2021-03-11 RX ORDER — ONDANSETRON 2 MG/ML
4 INJECTION INTRAMUSCULAR; INTRAVENOUS EVERY 6 HOURS PRN
Status: DISCONTINUED | OUTPATIENT
Start: 2021-03-11 | End: 2021-03-16

## 2021-03-11 RX ORDER — LATANOPROST 50 UG/ML
1 SOLUTION/ DROPS OPHTHALMIC NIGHTLY
Status: DISCONTINUED | OUTPATIENT
Start: 2021-03-11 | End: 2021-03-16

## 2021-03-11 RX ORDER — ASPIRIN 81 MG/1
81 TABLET, CHEWABLE ORAL DAILY
Status: DISCONTINUED | OUTPATIENT
Start: 2021-03-12 | End: 2021-03-14

## 2021-03-11 RX ORDER — MELATONIN
3 NIGHTLY PRN
Status: DISCONTINUED | OUTPATIENT
Start: 2021-03-11 | End: 2021-03-16

## 2021-03-11 RX ORDER — TIMOLOL MALEATE 5 MG/ML
1 SOLUTION/ DROPS OPHTHALMIC EVERY MORNING
Status: DISCONTINUED | OUTPATIENT
Start: 2021-03-12 | End: 2021-03-16

## 2021-03-11 RX ORDER — LIOTHYRONINE SODIUM 25 UG/1
25 TABLET ORAL
Status: DISCONTINUED | OUTPATIENT
Start: 2021-03-12 | End: 2021-03-16

## 2021-03-11 RX ORDER — METOPROLOL SUCCINATE 50 MG/1
50 TABLET, EXTENDED RELEASE ORAL NIGHTLY
Status: DISCONTINUED | OUTPATIENT
Start: 2021-03-11 | End: 2021-03-12

## 2021-03-11 RX ORDER — HEPARIN SODIUM AND DEXTROSE 10000; 5 [USP'U]/100ML; G/100ML
INJECTION INTRAVENOUS CONTINUOUS
Status: DISCONTINUED | OUTPATIENT
Start: 2021-03-12 | End: 2021-03-12

## 2021-03-11 RX ORDER — MEMANTINE HYDROCHLORIDE 10 MG/1
10 TABLET ORAL DAILY
Status: DISCONTINUED | OUTPATIENT
Start: 2021-03-12 | End: 2021-03-16

## 2021-03-11 RX ORDER — SODIUM CHLORIDE 9 MG/ML
INJECTION, SOLUTION INTRAVENOUS CONTINUOUS
Status: DISCONTINUED | OUTPATIENT
Start: 2021-03-11 | End: 2021-03-14

## 2021-03-11 RX ORDER — ACETAMINOPHEN 325 MG/1
650 TABLET ORAL EVERY 6 HOURS PRN
Status: DISCONTINUED | OUTPATIENT
Start: 2021-03-11 | End: 2021-03-16

## 2021-03-11 RX ORDER — LEVOTHYROXINE SODIUM 0.03 MG/1
25 TABLET ORAL
Status: DISCONTINUED | OUTPATIENT
Start: 2021-03-12 | End: 2021-03-16

## 2021-03-11 NOTE — ED PROVIDER NOTES
Patient Seen in: [facility] Emergency Department      History   Patient presents with:  Fatigue  Arrythmia/Palpitations    Stated Complaint: weakness, tachy    HPI/Subjective:   HPI    79-year-old female history of A. fib on Eliquis, sick sinus synd Review of Systems    Positive for stated complaint: weakness, tachy  Other systems are as noted in HPI. Constitutional and vital signs reviewed. All other systems reviewed and negative except as noted above.     Physical Exam     ED Triage Vital all extremities against gravity. face symmetric, speech clear. Alert, however not oriented to place or time.    Psychiatric:         Mood and Affect: Mood normal.             ED Course     Labs Reviewed   COMP METABOLIC PANEL (14) - Abnormal; Notable for t Atrial Fibrillation  Reading: ST depression with T wave inversion inferior leads, anterior lateral leads; not seen on prior EKG              CT BRAIN OR HEAD (21839)    Result Date: 3/11/2021  CONCLUSION:  No evidence of acute intracranial hemorrhage.    Chely Noe foraminal stenosis without spinal canal stenosis. C4-C5:  Posterior osteophyte with facet hypertrophic changes is noted. Mild left neural foraminal stenosis without spinal canal stenosis.  C5-C6:  Left paracentral posterior osteophyte with facet hypertroph for ST depressions and elevated troponin, no bolus given her age.    Admission disposition: 3/11/2021  5:18 PM           Critical Care Statement:   Upon my evaluation, this patient had a high probability of imminent or life-threatening deterioration which r

## 2021-03-11 NOTE — ED INITIAL ASSESSMENT (HPI)
Patient presents via EMS with c/o weakness. They were called for lift assist last night, and then again just a short while ago. Per nursing staff, patient slightly more confused than normal. Patient is sleepy on arrival, but arouses easily to voice.  Her he

## 2021-03-11 NOTE — CM/SW NOTE
Spoke with [facility] [address] [place] [address], [phone],  regarding any paperwork/information the facility has on the patient.   [facility] is faxing over paperwork to [phone].    5:30pm - no fax received - called [facility] again and she will refax

## 2021-03-12 ENCOUNTER — APPOINTMENT (OUTPATIENT)
Dept: CV DIAGNOSTICS | Facility: HOSPITAL | Age: 86
End: 2021-03-12
Attending: INTERNAL MEDICINE
Payer: MEDICARE

## 2021-03-12 LAB
ALBUMIN SERPL-MCNC: 3.2 G/DL (ref 3.4–5)
ALBUMIN/GLOB SERPL: 1 {RATIO} (ref 1–2)
ALP LIVER SERPL-CCNC: 61 U/L
ALT SERPL-CCNC: 16 U/L
ANION GAP SERPL CALC-SCNC: 12 MMOL/L (ref 0–18)
APTT PPP: 68.8 SECONDS (ref 25.4–36.1)
AST SERPL-CCNC: 23 U/L (ref 15–37)
ATRIAL RATE: 119 BPM
ATRIAL RATE: 120 BPM
BASOPHILS # BLD AUTO: 0.05 X10(3) UL (ref 0–0.2)
BASOPHILS NFR BLD AUTO: 0.9 %
BILIRUB SERPL-MCNC: 0.7 MG/DL (ref 0.1–2)
BUN BLD-MCNC: 66 MG/DL (ref 7–18)
BUN/CREAT SERPL: 27.2 (ref 10–20)
CALCIUM BLD-MCNC: 10.8 MG/DL (ref 8.5–10.1)
CHLORIDE SERPL-SCNC: 103 MMOL/L (ref 98–112)
CHOLEST SMN-MCNC: 170 MG/DL (ref ?–200)
CO2 SERPL-SCNC: 24 MMOL/L (ref 21–32)
CREAT BLD-MCNC: 2.43 MG/DL
DEPRECATED RDW RBC AUTO: 62.3 FL (ref 35.1–46.3)
EOSINOPHIL # BLD AUTO: 0.14 X10(3) UL (ref 0–0.7)
EOSINOPHIL NFR BLD AUTO: 2.6 %
ERYTHROCYTE [DISTWIDTH] IN BLOOD BY AUTOMATED COUNT: 18.1 % (ref 11–15)
GLOBULIN PLAS-MCNC: 3.1 G/DL (ref 2.8–4.4)
GLUCOSE BLD-MCNC: 73 MG/DL (ref 70–99)
HAV IGM SER QL: 2.1 MG/DL (ref 1.6–2.6)
HCT VFR BLD AUTO: 39.9 %
HDLC SERPL-MCNC: 53 MG/DL (ref 40–59)
HGB BLD-MCNC: 12.1 G/DL
IMM GRANULOCYTES # BLD AUTO: 0.02 X10(3) UL (ref 0–1)
IMM GRANULOCYTES NFR BLD: 0.4 %
LDLC SERPL CALC-MCNC: 98 MG/DL (ref ?–100)
LYMPHOCYTES # BLD AUTO: 0.67 X10(3) UL (ref 1–4)
LYMPHOCYTES NFR BLD AUTO: 12.5 %
M PROTEIN MFR SERPL ELPH: 6.3 G/DL (ref 6.4–8.2)
MCH RBC QN AUTO: 28.6 PG (ref 26–34)
MCHC RBC AUTO-ENTMCNC: 30.3 G/DL (ref 31–37)
MCV RBC AUTO: 94.3 FL
MONOCYTES # BLD AUTO: 0.47 X10(3) UL (ref 0.1–1)
MONOCYTES NFR BLD AUTO: 8.8 %
NEUTROPHILS # BLD AUTO: 4.02 X10 (3) UL (ref 1.5–7.7)
NEUTROPHILS # BLD AUTO: 4.02 X10(3) UL (ref 1.5–7.7)
NEUTROPHILS NFR BLD AUTO: 74.8 %
NONHDLC SERPL-MCNC: 117 MG/DL (ref ?–130)
OSMOLALITY SERPL CALC.SUM OF ELEC: 306 MOSM/KG (ref 275–295)
PLATELET # BLD AUTO: 154 10(3)UL (ref 150–450)
POTASSIUM SERPL-SCNC: 3.7 MMOL/L (ref 3.5–5.1)
PTH-INTACT SERPL-MCNC: 8.3 PG/ML (ref 18.5–88)
Q-T INTERVAL: 332 MS
Q-T INTERVAL: 338 MS
QRS DURATION: 90 MS
QRS DURATION: 96 MS
QTC CALCULATION (BEZET): 467 MS
QTC CALCULATION (BEZET): 473 MS
R AXIS: 42 DEGREES
R AXIS: 76 DEGREES
RBC # BLD AUTO: 4.23 X10(6)UL
SODIUM SERPL-SCNC: 139 MMOL/L (ref 136–145)
T AXIS: 239 DEGREES
T AXIS: 247 DEGREES
T3FREE SERPL-MCNC: 5.42 PG/ML (ref 2.4–4.2)
T4 FREE SERPL-MCNC: 0.6 NG/DL (ref 0.8–1.7)
TRIGL SERPL-MCNC: 97 MG/DL (ref 30–149)
TROPONIN I SERPL-MCNC: 0.07 NG/ML (ref ?–0.04)
TSI SER-ACNC: 0.1 MIU/ML (ref 0.36–3.74)
VENTRICULAR RATE: 115 BPM
VENTRICULAR RATE: 122 BPM
VIT D+METAB SERPL-MCNC: 16.4 NG/ML (ref 30–100)
VLDLC SERPL CALC-MCNC: 19 MG/DL (ref 0–30)
WBC # BLD AUTO: 5.4 X10(3) UL (ref 4–11)

## 2021-03-12 PROCEDURE — 99225 SUBSEQUENT OBSERVATION CARE: CPT | Performed by: INTERNAL MEDICINE

## 2021-03-12 PROCEDURE — 93306 TTE W/DOPPLER COMPLETE: CPT | Performed by: INTERNAL MEDICINE

## 2021-03-12 RX ORDER — MIRABEGRON 25 MG/1
25 TABLET, FILM COATED, EXTENDED RELEASE ORAL DAILY
COMMUNITY

## 2021-03-12 RX ORDER — DILTIAZEM HYDROCHLORIDE 60 MG/1
60 TABLET, FILM COATED ORAL EVERY 6 HOURS SCHEDULED
Status: DISCONTINUED | OUTPATIENT
Start: 2021-03-12 | End: 2021-03-14

## 2021-03-12 RX ORDER — METOPROLOL TARTRATE 50 MG/1
50 TABLET, FILM COATED ORAL
Status: DISCONTINUED | OUTPATIENT
Start: 2021-03-12 | End: 2021-03-16

## 2021-03-12 RX ORDER — MELATONIN
1000 DAILY
COMMUNITY

## 2021-03-12 RX ORDER — OMEGA-3-ACID ETHYL ESTERS 1 G/1
1 CAPSULE, LIQUID FILLED ORAL EVERY MORNING
COMMUNITY
Start: 2020-12-27

## 2021-03-12 NOTE — PHYSICAL THERAPY NOTE
PHYSICAL THERAPY EVALUATION - INPATIENT     Room Number: 4335/2437-H  Evaluation Date: 3/12/2021  Type of Evaluation: Initial  Physician Order: PT Eval and Treat    Presenting Problem: +fall  Reason for Therapy: Mobility Dysfunction and Discharge Ping ASSESSMENT  Rating: Unable to rate          COGNITION  · Attention Span:  attends with cues to redirect  · Following Commands:  follows one step commands with repetition    RANGE OF MOTION AND STRENGTH ASSESSMENT  Upper extremity ROM and strength are withi Stand to sit at bed with CGA. Bed to chair ambulation without LOB. Pt pleasantly confused throughout entire session. RN aware pt up in chair with alarm activated.     Exercise/Education Provided:  Bed mobility  Body mechanics  Functional activity tolerat to/from Stand at assistance level: supervision     Goal #3 Patient is able to ambulate 50 feet with assist device: walker - rolling at assistance level: supervision     Goal #4    Goal #5    Goal #6    Goal Comments: Goals established on 3/12/2021

## 2021-03-12 NOTE — ED NOTES
Lab was called, confirmed they do not have a blue top tube that was sent down to run PTT, MD notified that pt was already started on heparin drip at [facility] and that there is no result for PTT. MD notified that this RN will send PTT now.

## 2021-03-12 NOTE — CM/SW NOTE
03/12/21 1400   CM/SW Referral Data   Referral Source Social Work (self-referral)   Reason for Referral Discharge planning   Informant Children   Patient Info   Patient's Mental Status Alert;Memory Impairments   Case discussed in rounds.  Pt is from [facility].

## 2021-03-12 NOTE — PLAN OF CARE
Assumed care of pt at 2145 from ED. Alert to self, confused. Hallucinations during the night, talking to herself. On RA. A fib on tele, -110 overnight, on cardizem gtt and heparin gtt per acs protocol. IVF infusing per order.  Skin assessment complete

## 2021-03-12 NOTE — PROGRESS NOTES
[facility]  Cardiology Progress Note    Subjective:  No chest pain or shortness of breath.  Resting comforatably    Objective:  /82 (BP Location: Right arm)   Pulse 99   Temp 97.9 °F (36.6 °C) (Axillary)   Resp 16   Ht 5' 4\" (1.626 m)   Wt 128 l assessment and plan. Treat presumed UTI which should help with rate control of afib. Can uptitrate BB and add oral CCB and uptitrate as needed. Patient with PPM.  Can stop IV heparin and resume Eliquis 2.5 mg BID.   Conservative, medical therapy best opt

## 2021-03-12 NOTE — CONSULTS
Cardiology Consult Note     PRIMARY CARDIOLOGIST: Enoc/JUANJO      CONSULT FOR: 70-year-old female in from nursing home with advancing dementia. History of pacemaker within the last year.   Prior to that had an echocardiogram normal EF with moderate aortic

## 2021-03-12 NOTE — CONSULTS
[facility]    PATIENT'S NAME: Cassidy Wallace   ATTENDING PHYSICIAN: SAMANTHA Wilkinson: Kasi Bowen M.D.    PATIENT ACCOUNT#:   [de-identified]    LOCATION:  [address] [place] [address]  MEDICAL RECORD #:   [id number]       DATE OF BIRTH: increase in jugular venous pressure. LUNGS:  Without rales. HEART:  S1, S2.  Diastolic murmur, aortic pole. Systolic murmur into the axilla. ABDOMEN:  Positive bowel sounds. Nontender. Thin. EXTREMITIES:  No edema.   Radial pulses equal.    NEUROL

## 2021-03-12 NOTE — OCCUPATIONAL THERAPY NOTE
OCCUPATIONAL THERAPY EVALUATION - INPATIENT     Room Number: 2625/2625-A  Evaluation Date: 3/12/2021  Type of Evaluation: Initial  Presenting Problem: fall    Physician Order: IP Consult to Occupational Therapy  Reason for Therapy: ADL/IADL Dysfunction and date. Per chart review, pt was able to ambulate with RW typically. SUBJECTIVE   Pt is pleasant and cooperative throughout session.      Patient self-stated goal is not stated     OBJECTIVE  Precautions: Bed/chair alarm  Fall Risk: High fall risk    WEIGH 46.65%  Standardized Score (AM-PAC Scale): 38.66  CMS Modifier (G-Code): CK    FUNCTIONAL TRANSFER ASSESSMENT  Supine to Sit : Contact guard assist  Sit to Stand: Contact guard assist    Skilled Therapy Provided:   Pt received semi-supine in bed.    Pt comp record   Specific performance deficits impacting engagement in ADL/IADL LOW  1 - 3 performance deficits    Client Assessment/Performance Deficits MODERATE - Comorbidities and min to mod modifications of tasks    Clinical Decision Making MODERATE - Analysis

## 2021-03-12 NOTE — PROGRESS NOTES
[facility]  Progress Note     [facility] Patient Status:  Observation    10/13/1927 MRN [id number]   [facility] 2NE-A Attending Faisal Jamil MD   Hosp Day # 0 PCP Ashley Montenegro MD     Chief Complaint: AMS    S: Patient very Epic.    Medications:   • aspirin  81 mg Oral Daily   • Levothyroxine Sodium  25 mcg Oral Before breakfast   • latanoprost  1 drop Both Eyes Nightly   • Liothyronine Sodium  25 mcg Oral Before breakfast   • Memantine HCl  10 mg Oral Daily   • Metoprolol Ramires

## 2021-03-12 NOTE — H&P
[facility]  History and Physical     Zelda Ana Paula Patient Status:  Emergency    10/13/1927 MRN [id number]   Location [address] [facility] Attending Joanne Perez DO   Hosp Day # 0 PCP La Espinoza MD     Chief Complain Meclizine HCl 25 MG Oral Tab, Take 25 mg by mouth 2 (two) times a day., Disp: , Rfl:   torsemide 20 MG Oral Tab, Take 20 mg by mouth daily. , Disp: , Rfl:   aspirin 81 MG Oral Chew Tab, Chew 81 mg by mouth daily. , Disp: , Rfl:   modafinil 200 MG Oral Tab, to auscultation bilaterally. No wheezes. No rhonchi. Cardiovascular: S1, S2. IRIR in 90s  Chest and Back: No tenderness or deformity. Abdomen: Soft, nontender, nondistended. Positive bowel sounds. No rebound, guarding or organomegaly.   Neurologic: No fo

## 2021-03-12 NOTE — PLAN OF CARE
Pt is alert x to self, on Ra, afib on the monitor. No evidence of cardiovascular symptoms at this time. Pt is up with SBA and a walker, decreased urine production, incontinent of B/B.  Pt has a mepilex on her coccyx for protection only, area is red but tayo Evaluate effectiveness of antiarrhythmic and heart rate control medications as ordered  - Initiate emergency measures for life threatening arrhythmias  - Monitor electrolytes and administer replacement therapy as ordered  Outcome: Progressing

## 2021-03-13 LAB
ALBUMIN SERPL-MCNC: 2.8 G/DL (ref 3.4–5)
ALBUMIN/GLOB SERPL: 0.9 {RATIO} (ref 1–2)
ALP LIVER SERPL-CCNC: 57 U/L
ALT SERPL-CCNC: 16 U/L
ANION GAP SERPL CALC-SCNC: 9 MMOL/L (ref 0–18)
APTT PPP: 26.7 SECONDS (ref 25.4–36.1)
AST SERPL-CCNC: 17 U/L (ref 15–37)
BILIRUB SERPL-MCNC: 0.6 MG/DL (ref 0.1–2)
BUN BLD-MCNC: 51 MG/DL (ref 7–18)
BUN/CREAT SERPL: 22.7 (ref 10–20)
CALCIUM BLD-MCNC: 9.6 MG/DL (ref 8.5–10.1)
CHLORIDE SERPL-SCNC: 109 MMOL/L (ref 98–112)
CO2 SERPL-SCNC: 20 MMOL/L (ref 21–32)
CREAT BLD-MCNC: 2.25 MG/DL
GLOBULIN PLAS-MCNC: 3.2 G/DL (ref 2.8–4.4)
GLUCOSE BLD-MCNC: 88 MG/DL (ref 70–99)
M PROTEIN MFR SERPL ELPH: 6 G/DL (ref 6.4–8.2)
OSMOLALITY SERPL CALC.SUM OF ELEC: 299 MOSM/KG (ref 275–295)
POTASSIUM SERPL-SCNC: 4 MMOL/L (ref 3.5–5.1)
SODIUM SERPL-SCNC: 138 MMOL/L (ref 136–145)

## 2021-03-13 PROCEDURE — 99225 SUBSEQUENT OBSERVATION CARE: CPT | Performed by: INTERNAL MEDICINE

## 2021-03-13 RX ORDER — HALOPERIDOL 5 MG/ML
1 INJECTION INTRAMUSCULAR EVERY 6 HOURS PRN
Status: DISCONTINUED | OUTPATIENT
Start: 2021-03-13 | End: 2021-03-16

## 2021-03-13 NOTE — PROGRESS NOTES
[facility] Cardiology Progress Note    Subjective:  She is being fed by the tech. Somewhat restless overnight per nursing. Very hard of hearing.      Objective:  /59 (BP Location: Right arm)   Pulse 116   Temp 97.5 °F (36.4 °C) (Axillary)   Resp 18   Ht regurgitation. 10. Pulmonary arteries: Systolic pressure was moderately increased,       estimated to be 51mm Hg. PA peak pressure: 51mm Hg (S). Assessment:     · AF RVR, known chronic AF.  LVEF 55%, PAS 51 mm hg,  BB increased and CCB added, continue

## 2021-03-13 NOTE — DIETARY NOTE
DONYA Rodriguez 105     Admitting diagnosis:  Hypercalcemia [E83.52]  Confusion [R41.0]  NSTEMI (non-ST elevated myocardial infarction) [facility]) [I21.4]  Acute cystitis with hematuria [N30.01]  Fall, initial encounter [W19. X

## 2021-03-13 NOTE — PROGRESS NOTES
[facility]  Progress Note     Prudy Plenty Patient Status:  Observation    10/13/1927 MRN [id number]   [facility] 2NE-A Attending Kai Porter MD   Hosp Day # 0 PCP Elysia Thorne MD     Chief Complaint: drowsy     S: Patient s in [address] [place] [address] Rd.     Medications:   • apixaban  2.5 mg Oral BID   • dilTIAZem HCl  60 mg Oral 4 times per day   • metoprolol tartrate  50 mg Oral 2x Daily(Beta Blocker)   • aspirin  81 mg Oral Daily   • Levothyroxine Sodium  25 mcg Oral Before breakfast   • latanop MD Chris St MD

## 2021-03-13 NOTE — PLAN OF CARE
Rec in bed pt and VS appear stable denies CP/SOB sat maintained in 90's on RA. Afib controlled. Bed alarm and fall mat in place pt attempted to get out of bed unassisted. Appears confused. In bed resting.  attending MD rounded this a.m. will cont to monitor

## 2021-03-13 NOTE — PLAN OF CARE
Problem: SAFETY ADULT - FALL  Goal: Free from fall injury  Description: INTERVENTIONS:  - Assess pt frequently for physical needs  - Identify cognitive and physical deficits and behaviors that affect risk of falls.   - Sallis fall precautions as indica Progressing     Problem: Patient/Family Goals  Goal: Patient/Family Long Term Goal  Description: Patient's Long Term Goal: Discharge home     Interventions:  - Change medication   -labs   - ECHO   - Cardiology   - See additional Care Plan goals for specifi

## 2021-03-14 LAB
1,25-DIHYDROXYVITAMIN D: <5 PG/ML
ANION GAP SERPL CALC-SCNC: 7 MMOL/L (ref 0–18)
BUN BLD-MCNC: 46 MG/DL (ref 7–18)
BUN/CREAT SERPL: 19.3 (ref 10–20)
CALCIUM BLD-MCNC: 8.7 MG/DL (ref 8.5–10.1)
CHLORIDE SERPL-SCNC: 108 MMOL/L (ref 98–112)
CO2 SERPL-SCNC: 24 MMOL/L (ref 21–32)
CREAT BLD-MCNC: 2.38 MG/DL
GLUCOSE BLD-MCNC: 91 MG/DL (ref 70–99)
OSMOLALITY SERPL CALC.SUM OF ELEC: 299 MOSM/KG (ref 275–295)
POTASSIUM SERPL-SCNC: 3.3 MMOL/L (ref 3.5–5.1)
SODIUM SERPL-SCNC: 139 MMOL/L (ref 136–145)

## 2021-03-14 PROCEDURE — 99225 SUBSEQUENT OBSERVATION CARE: CPT | Performed by: INTERNAL MEDICINE

## 2021-03-14 RX ORDER — ERGOCALCIFEROL 1.25 MG/1
50000 CAPSULE ORAL
Status: DISCONTINUED | OUTPATIENT
Start: 2021-03-14 | End: 2021-03-16

## 2021-03-14 RX ORDER — DILTIAZEM HYDROCHLORIDE 180 MG/1
180 CAPSULE, EXTENDED RELEASE ORAL DAILY
Status: DISCONTINUED | OUTPATIENT
Start: 2021-03-14 | End: 2021-03-16

## 2021-03-14 RX ORDER — SODIUM CHLORIDE 9 MG/ML
INJECTION, SOLUTION INTRAVENOUS CONTINUOUS
Status: ACTIVE | OUTPATIENT
Start: 2021-03-14 | End: 2021-03-14

## 2021-03-14 RX ORDER — OXYBUTYNIN CHLORIDE 5 MG/1
5 TABLET, EXTENDED RELEASE ORAL DAILY
Status: DISCONTINUED | OUTPATIENT
Start: 2021-03-14 | End: 2021-03-16

## 2021-03-14 NOTE — PROGRESS NOTES
[facility]  Progress Note     Chantel Noyola Patient Status:  Observation    10/13/1927 MRN [id number]   [facility] 2NE-A Attending Brea Dahl MD   Hosp Day # 0 PCP Luz Mack MD     Chief Complaint: more awake today    S: last 168 hours. Recent Labs   Lab 03/11/21  1549 03/12/21  0835   TROP 0.064* 0.068*            Imaging: Imaging data reviewed in Epic.     Medications:   • dilTIAZem HCl ER Coated Beads  180 mg Oral Daily   • Oxybutynin Chloride ER  5 mg Oral Daily   • discharge: TBD  Discharge is dependent on: course  At this point Ms. Kristen Lombardo is expected to be discharge to: TBD     Plan of care discussed with pt and RN.     Mino Barrera MD

## 2021-03-14 NOTE — PLAN OF CARE
Tele monitoring. Alert times one to name only. Bed alarm. Agrawal catheter due to urinary retention. Iv fluid. Seroquel started at hs. Iv antibiotics. eliquis for dvt prophylaxis, cardizem/lopressor for rate control. Admitted s/p fall/increased confusion. antiarrhythmic and heart rate control medications as ordered  - Initiate emergency measures for life threatening arrhythmias  - Monitor electrolytes and administer replacement therapy as ordered  Outcome: Progressing     Problem: Patient/Family Goals  Goal

## 2021-03-14 NOTE — PROGRESS NOTES
[facility] Cardiology Progress Note    Subjective:  Better night. More alert for me today, said thank you.      Objective:  /53 (BP Location: Right arm)   Pulse 82   Temp 97.8 °F (36.6 °C) (Oral)   Resp 18   Ht 162.6 cm (5' 4\")   Wt 128 lb 4.9 oz (58.2 regurgitation. 10. Pulmonary arteries: Systolic pressure was moderately increased,       estimated to be 51mm Hg. PA peak pressure: 51mm Hg (S). Assessment:     · AF RVR, known chronic AF.  LVEF 55%, PAS 51 mm hg,  BB increased and CCB added, continue

## 2021-03-14 NOTE — PROGRESS NOTES
Assumed care of pt at 1530. Pt A&Oxself. VSS. Assessment completed. New stat lock placed for mullen catheter due to removal by patient. All needs met at this time. Will continue to monitor.

## 2021-03-14 NOTE — PLAN OF CARE
Rec in bed pt and VS appear stable. Denies CP/SOB. Alert to self able to make some needs known. 1:1 care and feeder many moments during shift for safety  reasons. sats maintained in 90's on RA air. HOB elevated to high fowlers bridgett well.  No posey restraint

## 2021-03-15 PROBLEM — Z71.89 GOALS OF CARE, COUNSELING/DISCUSSION: Status: ACTIVE | Noted: 2021-03-15

## 2021-03-15 PROBLEM — Z51.5 PALLIATIVE CARE ENCOUNTER: Status: ACTIVE | Noted: 2021-03-15

## 2021-03-15 LAB
ANION GAP SERPL CALC-SCNC: 7 MMOL/L (ref 0–18)
BUN BLD-MCNC: 44 MG/DL (ref 7–18)
BUN/CREAT SERPL: 23.7 (ref 10–20)
CALCIUM BLD-MCNC: 8.9 MG/DL (ref 8.5–10.1)
CHLORIDE SERPL-SCNC: 113 MMOL/L (ref 98–112)
CO2 SERPL-SCNC: 24 MMOL/L (ref 21–32)
CREAT BLD-MCNC: 1.86 MG/DL
GLUCOSE BLD-MCNC: 112 MG/DL (ref 70–99)
OSMOLALITY SERPL CALC.SUM OF ELEC: 310 MOSM/KG (ref 275–295)
POTASSIUM SERPL-SCNC: 3.9 MMOL/L (ref 3.5–5.1)
SODIUM SERPL-SCNC: 144 MMOL/L (ref 136–145)

## 2021-03-15 PROCEDURE — 99225 SUBSEQUENT OBSERVATION CARE: CPT | Performed by: INTERNAL MEDICINE

## 2021-03-15 PROCEDURE — 99205 OFFICE O/P NEW HI 60 MIN: CPT | Performed by: CLINICAL NURSE SPECIALIST

## 2021-03-15 RX ORDER — LORAZEPAM 2 MG/ML
0.25 INJECTION INTRAMUSCULAR EVERY 4 HOURS PRN
Status: DISCONTINUED | OUTPATIENT
Start: 2021-03-15 | End: 2021-03-16

## 2021-03-15 NOTE — CM/SW NOTE
Call from [place] with [facility]. They have accepted pt for hospice care and plan on her admitting to service tomorrow upon arrival home. They are arranging for WC at home. She can be reached directly at [phone].      Brandy Hsu RN, [facility]

## 2021-03-15 NOTE — PALLIATIVE CARE NOTE
Palliative Care Consult request from Dr. Miguel Salvador noted requesting discussion for goals of care. Discussed consultation request with patient's son/JENSEN Lima ([phone]) via phone today.  Family meeting planned for today at 200 to meet in the patient's ro

## 2021-03-15 NOTE — PLAN OF CARE
Assumed care of pt at [address]. A/ox1 (oriented to self), rm air, afib on tele. No reports of pain. Starting around 41087 13 48 83, pt started to become more anxious/irritable/impulsive; IV haldol administered. Impaired hearing w/ right hearing aid present.  Breath sound INTERVENTIONS:  - Continuous cardiac monitoring, monitor vital signs, obtain 12 lead EKG if indicated  - Evaluate effectiveness of antiarrhythmic and heart rate control medications as ordered  - Initiate emergency measures for life threatening arrhythmias circulation, skin condition, hydration, nutrition and elimination needs   - Reorient and redirection as needed  - Assess for the need to continue restraints  Outcome: Progressing

## 2021-03-15 NOTE — PROGRESS NOTES
[facility]  Progress Note     Marjorie Lucas Patient Status:  Observation    10/13/1927 MRN [id number]   [facility] 2NE-A Attending Cleve Warner MD   Hosp Day # 0 PCP Zackery uBcio MD     Chief Complaint: \" feel fine\"    S: Pa input(s): PTP, INR in the last 168 hours. Recent Labs   Lab 03/11/21  1549 03/12/21  0835   TROP 0.064* 0.068*            Imaging: Imaging data reviewed in Epic.     Medications:   • dilTIAZem HCl ER Coated Beads  180 mg Oral Daily   • Oxybutynin Chlorid on: course  At this point Ms. Artie Amaro is expected to be discharge to: TBD     Plan of care discussed with pt and RN.     Samantha Lagunas MD

## 2021-03-15 NOTE — PHYSICAL THERAPY NOTE
Following pt for PT services this date. Per chart review, pt planning to dc with hospice. Will dc from acute PT as not consistent with end of life care.

## 2021-03-15 NOTE — PLAN OF CARE
Assumed care of pt at 0730. Pt A&Ox1, oriented to self. NSR on tele. Lung sounds clear bilaterally on room air. Pt denies pain and SOB. Palliative meeting with son and  this afternoon to discuss plan of care. Agrawal cath removed today at 1130.  Up to heart rate control medications as ordered  - Initiate emergency measures for life threatening arrhythmias  - Monitor electrolytes and administer replacement therapy as ordered  Outcome: Progressing     Problem: Patient/Family Goals  Goal: Patient/Family Lo

## 2021-03-15 NOTE — CM/SW NOTE
Palliative care consulted. CM acknowledging order for [facility] upon return to [facility] per son request.     Melody Hays referral to [facility] [phone] via [address].  / to remain available for support and/or disc

## 2021-03-15 NOTE — PLAN OF CARE
Met with son. Update him in regards to POC. Plan for hospice. DC planning tomorrow.      Antonio Guardado MD

## 2021-03-15 NOTE — PROGRESS NOTES
Cardiology Progress Note     PRIMARY CARDIOLOGIST: Luis Carlos/[facility]      CONSULT FOR: A. fib rapid ventricular response hypertension known history for pacemaker       SUBJECTIVE: Patient supine position sleeping resting comfortably.   Responding to verbal and touc organomegaly. No abdominal bruits. Extremities: Extremities normal, atraumatic, no cyanosis or edema. Pulses: 2+ and symmetric all four extremities. Skin: Skin color, texture, turgor normal. No rashes or lesions   Neurologic: CNII-XII intact.  Normal at 55% pulmonary pressures 50 to 55 mmHg and has mild to moderate aortic insufficiency without aortic stenosis. Recommendations: Continue with rate controlling medication change to long-acting Cardizem at discharge. Maintain on current medications.   Fami

## 2021-03-15 NOTE — CONSULTS
[address] [facility]  [id number]  Hospital Day #0  Date of Consult: 03/15/21  Patient seen at: [facility]    Reason for Consultation:      Consult requested by Dr. Linsey Vazquez for evaluation of pallia mg, Nightly PRN  acetaminophen, 650 mg, Q6H PRN         Social History:      Marital Status:    Children: 2 sons and one daughter, Son Chip Primrose lives locally  Living Situation Prior to Hospitalization: Resident at [facility] Patient Live 5.4 03/12/2021    HGB 12.1 03/12/2021    HCT 39.9 03/12/2021    .0 03/12/2021       Coags:  Lab Results   Component Value Date    PT 14.0 01/25/2014    INR 1.16 (H) 01/13/2018    PTT 26.7 03/13/2021       Chemistry:  Lab Results   Component Value Da Normal or Reduced Full or confused   30 Bedbound Extensive Disease  Can't do any work Max Assist  Total Care Reduced Drowsy/confused   20 Bedbound Extensive Disease  Can't do any work Max Assist  Total Care Minimal Drowsy/confused   10 Bedbound/coma Extens goal is to focus on Rika's comfort and QOL and avoid/eliminate re-hospitalizations. He does not want further aggressive medical care.      Fears/concerns: Rocio Hooker is a little concerned about his sister's reaction to the decision for hospice but knows his broth (urinary tract infection), bacterial     Dehydration     Renal insufficiency     Hyponatremia     Hyperglycemia     Wide-complex tachycardia (HCC)     Atrial fibrillation, chronic (HCC)     Benign essential HTN     Acute cystitis without hematuria     Othe Philip Landeros (who met with son also and was in agreement with above plan. She also provided clinical update), RN updated as well.      Zaid aSm, APRN  Palliative Care  Phone [phone]  3/15/2021  2:00 PM

## 2021-03-16 VITALS
SYSTOLIC BLOOD PRESSURE: 124 MMHG | TEMPERATURE: 98 F | BODY MASS INDEX: 21.91 KG/M2 | HEIGHT: 64 IN | WEIGHT: 128.31 LBS | DIASTOLIC BLOOD PRESSURE: 74 MMHG | RESPIRATION RATE: 20 BRPM | OXYGEN SATURATION: 93 % | HEART RATE: 98 BPM

## 2021-03-16 LAB
ANION GAP SERPL CALC-SCNC: 10 MMOL/L (ref 0–18)
BUN BLD-MCNC: 51 MG/DL (ref 7–18)
BUN/CREAT SERPL: 22.8 (ref 10–20)
CALCIUM BLD-MCNC: 9 MG/DL (ref 8.5–10.1)
CHLORIDE SERPL-SCNC: 109 MMOL/L (ref 98–112)
CO2 SERPL-SCNC: 19 MMOL/L (ref 21–32)
CREAT BLD-MCNC: 2.24 MG/DL
GLUCOSE BLD-MCNC: 107 MG/DL (ref 70–99)
OSMOLALITY SERPL CALC.SUM OF ELEC: 300 MOSM/KG (ref 275–295)
POTASSIUM SERPL-SCNC: 4.3 MMOL/L (ref 3.5–5.1)
SODIUM SERPL-SCNC: 138 MMOL/L (ref 136–145)

## 2021-03-16 PROCEDURE — 99217 OBSERVATION CARE DISCHARGE: CPT | Performed by: INTERNAL MEDICINE

## 2021-03-16 RX ORDER — BISACODYL 10 MG
10 SUPPOSITORY, RECTAL RECTAL
Status: DISCONTINUED | OUTPATIENT
Start: 2021-03-16 | End: 2021-03-16

## 2021-03-16 RX ORDER — POLYETHYLENE GLYCOL 3350 17 G/17G
17 POWDER, FOR SOLUTION ORAL DAILY
Status: DISCONTINUED | OUTPATIENT
Start: 2021-03-16 | End: 2021-03-16

## 2021-03-16 RX ORDER — DILTIAZEM HYDROCHLORIDE 180 MG/1
180 CAPSULE, COATED, EXTENDED RELEASE ORAL DAILY
Qty: 30 CAPSULE | Refills: 0 | Status: SHIPPED | OUTPATIENT
Start: 2021-03-17

## 2021-03-16 NOTE — PLAN OF CARE
Assumed care of pt at 0730. A&Ox1, oriented to self. Very impulsive and trying to get out of bed quickly, bed alarm set. NSR on tele. Lung sounds clear and diminished on room air bilaterally. Pt denies pain and SOB.  Pt ambulating to bedside commode with 2 emergency measures for life threatening arrhythmias  - Monitor electrolytes and administer replacement therapy as ordered  Outcome: Progressing     Problem: Patient/Family Goals  Goal: Patient/Family Long Term Goal  Description: Patient's Long Term Goal: D

## 2021-03-16 NOTE — CM/SW NOTE
MSW spoke with Mónica Larose from [address] [place] [address] Drive: [phone]. Per Mónica Larose, they are meeting the patient's son at his home today at noon to sign consents.   They requested discharge at 2pm and will have an RN meet the patient at her home at 2:30 f

## 2021-03-16 NOTE — DISCHARGE SUMMARY
[facility] [place] HOSPITALIST  DISCHARGE SUMMARY     Zelda Goss Patient Status:  Observation    10/13/1927 MRN [id number]   [facility] 2NE-A Attending Darleen Hooks MD   Hosp Day # 0 PCP La Espinoza MD     Date of Admission: 3/11/2021  Date o and reviewed all home meds. Once pt meets with hospice then they can review the medication and what patient wish to continue. If Eliquis is continued, per cardiologist recommend to hold ASA.      Lace+ Score: 59  59-90 High Risk  29-58 Medium Risk  0-28   L Mirabegron ER      Take 25 mg by mouth daily. Refills: 0     Omega-3-acid Ethyl Esters 1 g Caps  Commonly known as: LOVAZA      Take 1 capsule by mouth every morning.    Refills: 0     Timolol Maleate 0.5 % Soln  Commonly known as: TIMOPTIC      Place 1 d

## 2021-03-16 NOTE — PLAN OF CARE
Assumed care of pt at [address]. A/ox1 (oriented to self), rm air, afib on tele. No reports of pain. Impaired hearing w/ right hearing aid present. Breath sounds diminished bilaterally. Generalized bruising noted to arms.  Stage 1 pressure ulcer present on coccy arrhythmias  - Monitor electrolytes and administer replacement therapy as ordered  Outcome: Progressing     Problem: Patient/Family Goals  Goal: Patient/Family Long Term Goal  Description: Patient's Long Term Goal: Discharge home  by 3/21/21    Daren

## 2021-03-16 NOTE — DISCHARGE PLANNING
D/C orders received. IV removed, IV catheter intact. Follow up appointments discussed. New meds prescribed to pharmacy. Discharge paperwork given and discussed. Report given to EMTs transporting patient. Paperwork sent with patient to home.   Rizwan

## 2021-03-30 LAB — PTH RELATED PEPTIDE: 3.6 PMOL/L

## 2021-05-18 ENCOUNTER — TELEPHONE (OUTPATIENT)
Dept: CARDIOLOGY | Age: 86
End: 2021-05-18

## 2024-05-09 NOTE — PROGRESS NOTES
----- Message from Rhona Woods MA sent at 5/9/2024  3:26 PM CDT -----    ----- Message -----  From: Natalya Green  Sent: 5/9/2024   2:30 PM CDT  To: Dami STEVENSON Staff    Pt called asking to receive a call to discuss x-ray results,please contact pt at  [phone].    PC - advised patient of no fractures in the right shoulder xray and rib xray.  Continue OTC analgesia  Apply ice bags as needed for comfort  Slow, deep breathing exercises   S/p PPM generator change, left chest. Aquacel dressing c/d/i. Patient denies pain 0/10 on pain scale. Son at bedside. Dr. Mallory Course speaking with son. All questions answered, verbalized understanding.  Discharge instructions given/explained to patient/son,

## (undated) NOTE — ED AVS SNAPSHOT
Placido Carrington   MRN: [id number]    Department:  [facility] Emergency Department   Date of Visit:  11/3/2019           Disclosure     Insurance plans vary and the physician(s) referred by the ER may not be covered by your plan.  Please contact your tell this physician (or your personal doctor if your instructions are to return to your personal doctor) about any new or lasting problems. The primary care or specialist physician will see patients referred from the [facility] Emergency Department.  Pradeep Rendon

## (undated) NOTE — ED AVS SNAPSHOT
Dee Blount   MRN: [id number]    Department:  [facility] Emergency Department   Date of Visit:  9/30/2017           Disclosure     Insurance plans vary and the physician(s) referred by the ER may not be covered by your plan.  Please contact your If you have been prescribed any medication(s), please fill your prescription right away and begin taking the medication(s) as directed    If the emergency physician has read X-rays, these will be re-interpreted by a radiologist.  If there is a significant

## (undated) NOTE — IP AVS SNAPSHOT
[address]            (For Outpatient Use Only) Initial Admit Date: 3/11/2021   Inpt/Obs Admit Date: Inpt: N/A / Obs: 03/11/21   Discharge Date:    [facility] [facility]:  [de-identified]   MRN: [de-identified]   CSN: [id number]   CEID: [id number] Name:  Lio Gloria :    Subscriber ID:  Pt Rel to Subscriber:    Hospital Account Financial Class: Medicare Advantage    2021

## (undated) NOTE — ED AVS SNAPSHOT
Efrain Liu   MRN: [id number]    Department:  [facility] Emergency Department   Date of Visit:  12/2/2018           Disclosure     Insurance plans vary and the physician(s) referred by the ER may not be covered by your plan.  Please contact your tell this physician (or your personal doctor if your instructions are to return to your personal doctor) about any new or lasting problems. The primary care or specialist physician will see patients referred from the [facility] Emergency Department.  Tiffanie Katz

## (undated) NOTE — ED AVS SNAPSHOT
Anca Rios   MRN: [id number]    Department:  [facility] Emergency Department   Date of Visit:  10/14/2018           Disclosure     Insurance plans vary and the physician(s) referred by the ER may not be covered by your plan.  Please contact you tell this physician (or your personal doctor if your instructions are to return to your personal doctor) about any new or lasting problems. The primary care or specialist physician will see patients referred from the [facility] Emergency Department.  Cruz Gonzalez

## (undated) NOTE — ED AVS SNAPSHOT
Omari Hall   MRN: [id number]    Department:  [facility] Emergency Department   Date of Visit:  10/9/2019           Disclosure     Insurance plans vary and the physician(s) referred by the ER may not be covered by your plan.  Please contact your tell this physician (or your personal doctor if your instructions are to return to your personal doctor) about any new or lasting problems. The primary care or specialist physician will see patients referred from the [facility] Emergency Department.  Ozzie Ferro

## (undated) NOTE — ED AVS SNAPSHOT
Camryn Walker   MRN: [id number]    Department:  [facility] Emergency Department   Date of Visit:  4/16/2019           Disclosure     Insurance plans vary and the physician(s) referred by the ER may not be covered by your plan.  Please contact your tell this physician (or your personal doctor if your instructions are to return to your personal doctor) about any new or lasting problems. The primary care or specialist physician will see patients referred from the [facility] Emergency Department.  Pradeep Rendon

## (undated) NOTE — LETTER
[facility] [address], [address]  Consent for Procedure/Sedation    Date:     Time:       1.  I authorize the performance upon Placido Carrington the following:  Dual chamber permanent pacemaker generator change and/or possible lead r Printed: 2020   9:44 AM  Patient Name: Marcy Larose        : 10/13/1927       Medical Record #: [id number]

## (undated) NOTE — LETTER
Patient Name: Fatoumata Young        : 10/13/1927       Medical Record #: [id number]    CONSENT FOR PROCEDURES/SEDATION    Date: 10/27/2017       Time: 5:19 PM        1.  I authorize the performance upon Fatoumata Young the following: Cardioversion      2